# Patient Record
Sex: MALE | Race: WHITE | NOT HISPANIC OR LATINO | URBAN - METROPOLITAN AREA
[De-identification: names, ages, dates, MRNs, and addresses within clinical notes are randomized per-mention and may not be internally consistent; named-entity substitution may affect disease eponyms.]

---

## 2020-09-17 ENCOUNTER — INPATIENT (INPATIENT)
Facility: HOSPITAL | Age: 70
LOS: 3 days | Discharge: HOME | End: 2020-09-21
Attending: HOSPITALIST | Admitting: HOSPITALIST
Payer: MEDICARE

## 2020-09-17 VITALS
DIASTOLIC BLOOD PRESSURE: 76 MMHG | HEIGHT: 73 IN | TEMPERATURE: 103 F | SYSTOLIC BLOOD PRESSURE: 131 MMHG | HEART RATE: 125 BPM | RESPIRATION RATE: 25 BRPM | OXYGEN SATURATION: 87 %

## 2020-09-17 LAB
BASE EXCESS BLDV CALC-SCNC: -0.5 MMOL/L — SIGNIFICANT CHANGE UP
BLOOD GAS VENOUS - CREATININE: 1.74 MG/DL — HIGH (ref 0.5–1.3)
BLOOD GAS VENOUS - FIO2: 21 — SIGNIFICANT CHANGE UP
CHLORIDE BLDV-SCNC: 110 MMOL/L — HIGH (ref 96–108)
GAS PNL BLDV: 133 MMOL/L — LOW (ref 136–146)
GLUCOSE BLDV-MCNC: 136 MG/DL — HIGH (ref 70–99)
HCO3 BLDV-SCNC: 22 MMOL/L — SIGNIFICANT CHANGE UP (ref 20–27)
HCT VFR BLDV CALC: 43.1 % — SIGNIFICANT CHANGE UP (ref 39–51)
HGB BLDV-MCNC: 14 G/DL — SIGNIFICANT CHANGE UP (ref 13–17)
LACTATE BLDV-MCNC: 2.7 MMOL/L — HIGH (ref 0.5–2)
PCO2 BLDV: 46 MMHG — SIGNIFICANT CHANGE UP (ref 41–51)
PH BLDV: 7.34 PH — SIGNIFICANT CHANGE UP (ref 7.32–7.43)
PO2 BLDV: 30 MMHG — LOW (ref 35–40)
POTASSIUM BLDV-SCNC: 5.3 MMOL/L — HIGH (ref 3.4–4.5)
SAO2 % BLDV: 46.9 % — LOW (ref 60–85)

## 2020-09-17 PROCEDURE — 99284 EMERGENCY DEPT VISIT MOD MDM: CPT

## 2020-09-17 RX ORDER — ACETAMINOPHEN 500 MG
975 TABLET ORAL ONCE
Refills: 0 | Status: COMPLETED | OUTPATIENT
Start: 2020-09-17 | End: 2020-09-17

## 2020-09-17 RX ADMIN — Medication 975 MILLIGRAM(S): at 23:37

## 2020-09-17 NOTE — ED PROVIDER NOTE - PHYSICAL EXAMINATION
General: +labored breathing w/ retractions and accessory muscle use, coughing  HEENT: NCAT  Cardiac: Tachy but regular, no murmurs, 2+ peripheral pulses  Chest: +diffuse crackles  Abdomen: soft, non-distended, bowel sounds present, no ttp, no rebound or guarding  Extremities: -peripheral edema, -calf ttp  Neuro: AAOx4, motor and sensory grossly intact  Psych: mood and affect appropriate

## 2020-09-17 NOTE — ED PROVIDER NOTE - NS ED ROS FT
Constitutional: +fevers, chills  HEENT: +cough, -rhinorrhea  Cardiac: no chest pain, palpitations  Respiratory: +SOB  GI: no n/v, abd pain, +loose stools  : no dysuria  Neuro: no headache

## 2020-09-17 NOTE — ED ADULT NURSE NOTE - CHIEF COMPLAINT QUOTE
c/o SOB onset 1 weka go worse last two days, pt  reports hx of COPD, not on oxygen at home. [id number] in full and complete [id number] in triage, noted pt to have tachypnea,  pt endorses fever, chills, at home, audible  rales noted. pt reports Hx of tremors, unaware of specific diagnoses. Pt place don O2 non rebreather with improvement to 97% O 2sat.

## 2020-09-17 NOTE — ED ADULT NURSE NOTE - OBJECTIVE STATEMENT
Tereso RN: Received pt in room 6, pt A&Ox4, appears very tachypneic, o2sat 97% on NRB mask. Pt c/o worsening SOB, cough, fever for 1 week. Denies sick contacts, unknown covid exposure. Reports hx of COPD not on home o2. Also reports hx of b/l arm tremors. Abdomen soft, nondistended, nontender. Sinus tach on cardiac monitor. Reports everyday smoker. IVL 20g Angiocath placed on right AC. Labs drawn/sent. COVID-19 swab sent. MD Nevarez at bedside for eval. Report endorsed to primary RN Kiki.

## 2020-09-17 NOTE — ED PROVIDER NOTE - CARE PLAN
Principal Discharge DX:	Pneumonia  Secondary Diagnosis:	Respiratory distress  Secondary Diagnosis:	Hypoxia

## 2020-09-17 NOTE — ED ADULT TRIAGE NOTE - CHIEF COMPLAINT QUOTE
c/o SOB onset 1 weka go worse last two days, pt  reports hx of COPD, not on oxygen at home. [id number] in full and complete [id number] in triage, noted pt to have tachypnea,  pt endorses fever, chills, at home, audible  rales noted. pt reports Hx of tremors, unaware of specific diagnoses. Pt place don O2 non rebreather with improvement to 97% O 2sat.

## 2020-09-17 NOTE — ED PROVIDER NOTE - OBJECTIVE STATEMENT
70yo M hx of COPD not on home O2 comes to ED for 1 week of fevers and SOB. Associated w/ cough productive of clear sputum. Denies cp, leg swelling, calf tenderness. SOB not worse with laying down. No hx of CHF. Took Tylenol for fevers. No recent travel or sick contacts. Has had some loose stools over the past few days. Otherwise denies cp, n/v, abdominal, change in urine.

## 2020-09-17 NOTE — ED PROVIDER NOTE - CLINICAL SUMMARY MEDICAL DECISION MAKING FREE TEXT BOX
pt sob, hypoxic and febrile on presentation. Exam showing diffuse crackles bilaterally. Concern for COVID, pneumonia, CHF. Although patient meets sepsis criteria at this moment, will hold fluids until xray obtained. Pressure is holding steady w/ good mentation on exam. PE considered but alternative diagnosis more likely at this time. Labs, CXR, EKG, COVID, abx. Will closely reassess.

## 2020-09-17 NOTE — ED PROVIDER NOTE - ATTENDING CONTRIBUTION TO CARE
Attending note:   After face to face evaluation of this patient, I concur with above noted hx, pe, and care plan for this patient.  Nevarez: 69 yom smoker with COPD complaining of shortness of breath worsening for 8 days with 4 days of fever, cough with no sputum production, chest pain only with cough and worsening fatigue, can't sleep. No leg swelling, no travel, no covid contacts, only stays home or wears mask. On exam, pt is in mild resp distress, 91% RA, over 95% on 4L, diffuse crackles in all lung fields, tachy, febrile, RRR, abd soft and non tender, no edema, no calf tn. slightly pale. Pt is likely to have PNA bacterial vs covid  with hypoxia requiring new O2 use - labs, CXR, O2, antibiotics, admit.

## 2020-09-18 ENCOUNTER — TRANSCRIPTION ENCOUNTER (OUTPATIENT)
Age: 70
End: 2020-09-18

## 2020-09-18 DIAGNOSIS — J44.9 CHRONIC OBSTRUCTIVE PULMONARY DISEASE, UNSPECIFIED: ICD-10-CM

## 2020-09-18 DIAGNOSIS — N17.9 ACUTE KIDNEY FAILURE, UNSPECIFIED: ICD-10-CM

## 2020-09-18 DIAGNOSIS — J18.9 PNEUMONIA, UNSPECIFIED ORGANISM: ICD-10-CM

## 2020-09-18 DIAGNOSIS — I10 ESSENTIAL (PRIMARY) HYPERTENSION: ICD-10-CM

## 2020-09-18 DIAGNOSIS — R09.02 HYPOXEMIA: ICD-10-CM

## 2020-09-18 DIAGNOSIS — E78.5 HYPERLIPIDEMIA, UNSPECIFIED: ICD-10-CM

## 2020-09-18 DIAGNOSIS — Z00.00 ENCOUNTER FOR GENERAL ADULT MEDICAL EXAMINATION WITHOUT ABNORMAL FINDINGS: ICD-10-CM

## 2020-09-18 DIAGNOSIS — A41.9 SEPSIS, UNSPECIFIED ORGANISM: ICD-10-CM

## 2020-09-18 LAB
ALBUMIN SERPL ELPH-MCNC: 2.6 G/DL — LOW (ref 3.3–5)
ALBUMIN SERPL ELPH-MCNC: 3.1 G/DL — LOW (ref 3.3–5)
ALP SERPL-CCNC: 103 U/L — SIGNIFICANT CHANGE UP (ref 40–120)
ALP SERPL-CCNC: 86 U/L — SIGNIFICANT CHANGE UP (ref 40–120)
ALT FLD-CCNC: 38 U/L — SIGNIFICANT CHANGE UP (ref 4–41)
ALT FLD-CCNC: 49 U/L — HIGH (ref 4–41)
ANION GAP SERPL CALC-SCNC: 14 MMO/L — SIGNIFICANT CHANGE UP (ref 7–14)
ANION GAP SERPL CALC-SCNC: 21 MMO/L — HIGH (ref 7–14)
ANISOCYTOSIS BLD QL: SLIGHT — SIGNIFICANT CHANGE UP
APPEARANCE UR: SIGNIFICANT CHANGE UP
AST SERPL-CCNC: 26 U/L — SIGNIFICANT CHANGE UP (ref 4–40)
AST SERPL-CCNC: 36 U/L — SIGNIFICANT CHANGE UP (ref 4–40)
B PERT DNA SPEC QL NAA+PROBE: NOT DETECTED — SIGNIFICANT CHANGE UP
BACTERIA # UR AUTO: NEGATIVE — SIGNIFICANT CHANGE UP
BASE EXCESS BLDV CALC-SCNC: -4 MMOL/L — SIGNIFICANT CHANGE UP
BASOPHILS # BLD AUTO: 0.07 K/UL — SIGNIFICANT CHANGE UP (ref 0–0.2)
BASOPHILS # BLD AUTO: 0.07 K/UL — SIGNIFICANT CHANGE UP (ref 0–0.2)
BASOPHILS NFR BLD AUTO: 0.3 % — SIGNIFICANT CHANGE UP (ref 0–2)
BASOPHILS NFR BLD AUTO: 0.3 % — SIGNIFICANT CHANGE UP (ref 0–2)
BASOPHILS NFR SPEC: 0 % — SIGNIFICANT CHANGE UP (ref 0–2)
BILIRUB SERPL-MCNC: 0.3 MG/DL — SIGNIFICANT CHANGE UP (ref 0.2–1.2)
BILIRUB SERPL-MCNC: 0.5 MG/DL — SIGNIFICANT CHANGE UP (ref 0.2–1.2)
BILIRUB UR-MCNC: NEGATIVE — SIGNIFICANT CHANGE UP
BLOOD GAS VENOUS - CREATININE: 1.7 MG/DL — HIGH (ref 0.5–1.3)
BLOOD UR QL VISUAL: NEGATIVE — SIGNIFICANT CHANGE UP
BUN SERPL-MCNC: 43 MG/DL — HIGH (ref 7–23)
BUN SERPL-MCNC: 46 MG/DL — HIGH (ref 7–23)
C PNEUM DNA SPEC QL NAA+PROBE: NOT DETECTED — SIGNIFICANT CHANGE UP
CALCIUM SERPL-MCNC: 8.6 MG/DL — SIGNIFICANT CHANGE UP (ref 8.4–10.5)
CALCIUM SERPL-MCNC: 9.3 MG/DL — SIGNIFICANT CHANGE UP (ref 8.4–10.5)
CHLORIDE BLDV-SCNC: 115 MMOL/L — HIGH (ref 96–108)
CHLORIDE SERPL-SCNC: 103 MMOL/L — SIGNIFICANT CHANGE UP (ref 98–107)
CHLORIDE SERPL-SCNC: 97 MMOL/L — LOW (ref 98–107)
CO2 SERPL-SCNC: 19 MMOL/L — LOW (ref 22–31)
CO2 SERPL-SCNC: 21 MMOL/L — LOW (ref 22–31)
COLOR SPEC: YELLOW — SIGNIFICANT CHANGE UP
CREAT SERPL-MCNC: 1.67 MG/DL — HIGH (ref 0.5–1.3)
CREAT SERPL-MCNC: 1.76 MG/DL — HIGH (ref 0.5–1.3)
DACRYOCYTES BLD QL SMEAR: SLIGHT — SIGNIFICANT CHANGE UP
EOSINOPHIL # BLD AUTO: 0.03 K/UL — SIGNIFICANT CHANGE UP (ref 0–0.5)
EOSINOPHIL # BLD AUTO: 0.06 K/UL — SIGNIFICANT CHANGE UP (ref 0–0.5)
EOSINOPHIL NFR BLD AUTO: 0.1 % — SIGNIFICANT CHANGE UP (ref 0–6)
EOSINOPHIL NFR BLD AUTO: 0.3 % — SIGNIFICANT CHANGE UP (ref 0–6)
EOSINOPHIL NFR FLD: 0 % — SIGNIFICANT CHANGE UP (ref 0–6)
FERRITIN SERPL-MCNC: 2457 NG/ML — HIGH (ref 30–400)
FLUAV H1 2009 PAND RNA SPEC QL NAA+PROBE: NOT DETECTED — SIGNIFICANT CHANGE UP
FLUAV H1 RNA SPEC QL NAA+PROBE: NOT DETECTED — SIGNIFICANT CHANGE UP
FLUAV H3 RNA SPEC QL NAA+PROBE: NOT DETECTED — SIGNIFICANT CHANGE UP
FLUAV SUBTYP SPEC NAA+PROBE: NOT DETECTED — SIGNIFICANT CHANGE UP
FLUBV RNA SPEC QL NAA+PROBE: NOT DETECTED — SIGNIFICANT CHANGE UP
GAS PNL BLDV: 135 MMOL/L — LOW (ref 136–146)
GIANT PLATELETS BLD QL SMEAR: PRESENT — SIGNIFICANT CHANGE UP
GLUCOSE BLDV-MCNC: 147 MG/DL — HIGH (ref 70–99)
GLUCOSE SERPL-MCNC: 142 MG/DL — HIGH (ref 70–99)
GLUCOSE SERPL-MCNC: 158 MG/DL — HIGH (ref 70–99)
GLUCOSE UR-MCNC: NEGATIVE — SIGNIFICANT CHANGE UP
HADV DNA SPEC QL NAA+PROBE: NOT DETECTED — SIGNIFICANT CHANGE UP
HCO3 BLDV-SCNC: 21 MMOL/L — SIGNIFICANT CHANGE UP (ref 20–27)
HCOV PNL SPEC NAA+PROBE: SIGNIFICANT CHANGE UP
HCT VFR BLD CALC: 35.4 % — LOW (ref 39–50)
HCT VFR BLD CALC: 42 % — SIGNIFICANT CHANGE UP (ref 39–50)
HCT VFR BLDV CALC: 34.8 % — LOW (ref 39–51)
HGB BLD-MCNC: 11.4 G/DL — LOW (ref 13–17)
HGB BLD-MCNC: 13.4 G/DL — SIGNIFICANT CHANGE UP (ref 13–17)
HGB BLDV-MCNC: 11.3 G/DL — LOW (ref 13–17)
HMPV RNA SPEC QL NAA+PROBE: NOT DETECTED — SIGNIFICANT CHANGE UP
HPIV1 RNA SPEC QL NAA+PROBE: NOT DETECTED — SIGNIFICANT CHANGE UP
HPIV2 RNA SPEC QL NAA+PROBE: NOT DETECTED — SIGNIFICANT CHANGE UP
HPIV3 RNA SPEC QL NAA+PROBE: NOT DETECTED — SIGNIFICANT CHANGE UP
HPIV4 RNA SPEC QL NAA+PROBE: NOT DETECTED — SIGNIFICANT CHANGE UP
HYALINE CASTS # UR AUTO: SIGNIFICANT CHANGE UP
IMM GRANULOCYTES NFR BLD AUTO: 1.6 % — HIGH (ref 0–1.5)
IMM GRANULOCYTES NFR BLD AUTO: 1.9 % — HIGH (ref 0–1.5)
KETONES UR-MCNC: NEGATIVE — SIGNIFICANT CHANGE UP
L PNEUMO AG UR QL: NEGATIVE — SIGNIFICANT CHANGE UP
LACTATE BLDV-MCNC: 1 MMOL/L — SIGNIFICANT CHANGE UP (ref 0.5–2)
LDH SERPL L TO P-CCNC: 209 U/L — SIGNIFICANT CHANGE UP (ref 135–225)
LEUKOCYTE ESTERASE UR-ACNC: NEGATIVE — SIGNIFICANT CHANGE UP
LYMPHOCYTES # BLD AUTO: 1.05 K/UL — SIGNIFICANT CHANGE UP (ref 1–3.3)
LYMPHOCYTES # BLD AUTO: 1.35 K/UL — SIGNIFICANT CHANGE UP (ref 1–3.3)
LYMPHOCYTES # BLD AUTO: 4.6 % — LOW (ref 13–44)
LYMPHOCYTES # BLD AUTO: 5.2 % — LOW (ref 13–44)
LYMPHOCYTES NFR SPEC AUTO: 4 % — LOW (ref 13–44)
MACROCYTES BLD QL: SIGNIFICANT CHANGE UP
MAGNESIUM SERPL-MCNC: 2.4 MG/DL — SIGNIFICANT CHANGE UP (ref 1.6–2.6)
MANUAL SMEAR VERIFICATION: SIGNIFICANT CHANGE UP
MCHC RBC-ENTMCNC: 28.8 PG — SIGNIFICANT CHANGE UP (ref 27–34)
MCHC RBC-ENTMCNC: 29.1 PG — SIGNIFICANT CHANGE UP (ref 27–34)
MCHC RBC-ENTMCNC: 31.9 % — LOW (ref 32–36)
MCHC RBC-ENTMCNC: 32.2 % — SIGNIFICANT CHANGE UP (ref 32–36)
MCV RBC AUTO: 90.3 FL — SIGNIFICANT CHANGE UP (ref 80–100)
MCV RBC AUTO: 90.3 FL — SIGNIFICANT CHANGE UP (ref 80–100)
METAMYELOCYTES # FLD: 1 % — SIGNIFICANT CHANGE UP (ref 0–1)
MONOCYTES # BLD AUTO: 1.83 K/UL — HIGH (ref 0–0.9)
MONOCYTES # BLD AUTO: 2.3 K/UL — HIGH (ref 0–0.9)
MONOCYTES NFR BLD AUTO: 10 % — SIGNIFICANT CHANGE UP (ref 2–14)
MONOCYTES NFR BLD AUTO: 7 % — SIGNIFICANT CHANGE UP (ref 2–14)
MONOCYTES NFR BLD: 4 % — SIGNIFICANT CHANGE UP (ref 2–9)
NEUTROPHIL AB SER-ACNC: 86 % — HIGH (ref 43–77)
NEUTROPHILS # BLD AUTO: 19.12 K/UL — HIGH (ref 1.8–7.4)
NEUTROPHILS # BLD AUTO: 22.5 K/UL — HIGH (ref 1.8–7.4)
NEUTROPHILS NFR BLD AUTO: 82.9 % — HIGH (ref 43–77)
NEUTROPHILS NFR BLD AUTO: 85.8 % — HIGH (ref 43–77)
NEUTS BAND # BLD: 4 % — SIGNIFICANT CHANGE UP (ref 0–6)
NITRITE UR-MCNC: NEGATIVE — SIGNIFICANT CHANGE UP
NRBC # BLD: 0 /100WBC — SIGNIFICANT CHANGE UP
NRBC # FLD: 0 K/UL — SIGNIFICANT CHANGE UP (ref 0–0)
NRBC # FLD: 0 K/UL — SIGNIFICANT CHANGE UP (ref 0–0)
OVALOCYTES BLD QL SMEAR: SLIGHT — SIGNIFICANT CHANGE UP
PCO2 BLDV: 36 MMHG — LOW (ref 41–51)
PH BLDV: 7.37 PH — SIGNIFICANT CHANGE UP (ref 7.32–7.43)
PH UR: 5.5 — SIGNIFICANT CHANGE UP (ref 5–8)
PHOSPHATE SERPL-MCNC: 4.2 MG/DL — SIGNIFICANT CHANGE UP (ref 2.5–4.5)
PLATELET # BLD AUTO: 354 K/UL — SIGNIFICANT CHANGE UP (ref 150–400)
PLATELET # BLD AUTO: 389 K/UL — SIGNIFICANT CHANGE UP (ref 150–400)
PLATELET CLUMP BLD QL SMEAR: SLIGHT — SIGNIFICANT CHANGE UP
PLATELET COUNT - ESTIMATE: NORMAL — SIGNIFICANT CHANGE UP
PMV BLD: 10.5 FL — SIGNIFICANT CHANGE UP (ref 7–13)
PMV BLD: 10.6 FL — SIGNIFICANT CHANGE UP (ref 7–13)
PO2 BLDV: 119 MMHG — HIGH (ref 35–40)
POIKILOCYTOSIS BLD QL AUTO: SIGNIFICANT CHANGE UP
POLYCHROMASIA BLD QL SMEAR: SLIGHT — SIGNIFICANT CHANGE UP
POTASSIUM BLDV-SCNC: 4.1 MMOL/L — SIGNIFICANT CHANGE UP (ref 3.4–4.5)
POTASSIUM SERPL-MCNC: 4.4 MMOL/L — SIGNIFICANT CHANGE UP (ref 3.5–5.3)
POTASSIUM SERPL-MCNC: 4.8 MMOL/L — SIGNIFICANT CHANGE UP (ref 3.5–5.3)
POTASSIUM SERPL-SCNC: 4.4 MMOL/L — SIGNIFICANT CHANGE UP (ref 3.5–5.3)
POTASSIUM SERPL-SCNC: 4.8 MMOL/L — SIGNIFICANT CHANGE UP (ref 3.5–5.3)
POTASSIUM UR-SCNC: 60.8 MMOL/L — SIGNIFICANT CHANGE UP
PROCALCITONIN SERPL-MCNC: 0.35 NG/ML — HIGH (ref 0.02–0.1)
PROT SERPL-MCNC: 6.9 G/DL — SIGNIFICANT CHANGE UP (ref 6–8.3)
PROT SERPL-MCNC: 7.9 G/DL — SIGNIFICANT CHANGE UP (ref 6–8.3)
PROT UR-MCNC: 30 — SIGNIFICANT CHANGE UP
RBC # BLD: 3.92 M/UL — LOW (ref 4.2–5.8)
RBC # BLD: 4.65 M/UL — SIGNIFICANT CHANGE UP (ref 4.2–5.8)
RBC # FLD: 15.1 % — HIGH (ref 10.3–14.5)
RBC # FLD: 15.2 % — HIGH (ref 10.3–14.5)
RBC CASTS # UR COMP ASSIST: SIGNIFICANT CHANGE UP (ref 0–?)
REVIEW TO FOLLOW: YES — SIGNIFICANT CHANGE UP
RSV RNA SPEC QL NAA+PROBE: NOT DETECTED — SIGNIFICANT CHANGE UP
RV+EV RNA SPEC QL NAA+PROBE: NOT DETECTED — SIGNIFICANT CHANGE UP
SAO2 % BLDV: 98.4 % — HIGH (ref 60–85)
SARS-COV-2 RNA SPEC QL NAA+PROBE: SIGNIFICANT CHANGE UP
SARS-COV-2 RNA SPEC QL NAA+PROBE: SIGNIFICANT CHANGE UP
SODIUM SERPL-SCNC: 137 MMOL/L — SIGNIFICANT CHANGE UP (ref 135–145)
SODIUM SERPL-SCNC: 138 MMOL/L — SIGNIFICANT CHANGE UP (ref 135–145)
SP GR SPEC: 1.02 — SIGNIFICANT CHANGE UP (ref 1–1.04)
SQUAMOUS # UR AUTO: SIGNIFICANT CHANGE UP
UROBILINOGEN FLD QL: NORMAL — SIGNIFICANT CHANGE UP
VARIANT LYMPHS # BLD: 1 % — SIGNIFICANT CHANGE UP
WBC # BLD: 23.04 K/UL — HIGH (ref 3.8–10.5)
WBC # BLD: 26.21 K/UL — HIGH (ref 3.8–10.5)
WBC # FLD AUTO: 23.04 K/UL — HIGH (ref 3.8–10.5)
WBC # FLD AUTO: 26.21 K/UL — HIGH (ref 3.8–10.5)
WBC UR QL: SIGNIFICANT CHANGE UP (ref 0–?)

## 2020-09-18 PROCEDURE — 71045 X-RAY EXAM CHEST 1 VIEW: CPT | Mod: 26

## 2020-09-18 PROCEDURE — 99223 1ST HOSP IP/OBS HIGH 75: CPT | Mod: GC,AI

## 2020-09-18 PROCEDURE — 99291 CRITICAL CARE FIRST HOUR: CPT

## 2020-09-18 RX ORDER — SODIUM CHLORIDE 9 MG/ML
1000 INJECTION INTRAMUSCULAR; INTRAVENOUS; SUBCUTANEOUS
Refills: 0 | Status: DISCONTINUED | OUTPATIENT
Start: 2020-09-18 | End: 2020-09-21

## 2020-09-18 RX ORDER — CEFTRIAXONE 500 MG/1
1000 INJECTION, POWDER, FOR SOLUTION INTRAMUSCULAR; INTRAVENOUS ONCE
Refills: 0 | Status: COMPLETED | OUTPATIENT
Start: 2020-09-18 | End: 2020-09-18

## 2020-09-18 RX ORDER — CHOLECALCIFEROL (VITAMIN D3) 125 MCG
0 CAPSULE ORAL
Qty: 0 | Refills: 0 | DISCHARGE

## 2020-09-18 RX ORDER — PROPRANOLOL HCL 160 MG
1 CAPSULE, EXTENDED RELEASE 24HR ORAL
Qty: 0 | Refills: 0 | DISCHARGE

## 2020-09-18 RX ORDER — PREGABALIN 225 MG/1
0 CAPSULE ORAL
Qty: 0 | Refills: 0 | DISCHARGE

## 2020-09-18 RX ORDER — FLUTICASONE FUROATE, UMECLIDINIUM BROMIDE AND VILANTEROL TRIFENATATE 200; 62.5; 25 UG/1; UG/1; UG/1
1 POWDER RESPIRATORY (INHALATION)
Qty: 0 | Refills: 0 | DISCHARGE

## 2020-09-18 RX ORDER — IPRATROPIUM/ALBUTEROL SULFATE 18-103MCG
3 AEROSOL WITH ADAPTER (GRAM) INHALATION EVERY 6 HOURS
Refills: 0 | Status: DISCONTINUED | OUTPATIENT
Start: 2020-09-18 | End: 2020-09-18

## 2020-09-18 RX ORDER — HEPARIN SODIUM 5000 [USP'U]/ML
5000 INJECTION INTRAVENOUS; SUBCUTANEOUS EVERY 12 HOURS
Refills: 0 | Status: DISCONTINUED | OUTPATIENT
Start: 2020-09-18 | End: 2020-09-21

## 2020-09-18 RX ORDER — AZITHROMYCIN 500 MG/1
500 TABLET, FILM COATED ORAL EVERY 24 HOURS
Refills: 0 | Status: DISCONTINUED | OUTPATIENT
Start: 2020-09-19 | End: 2020-09-21

## 2020-09-18 RX ORDER — ALBUTEROL 90 UG/1
2 AEROSOL, METERED ORAL ONCE
Refills: 0 | Status: COMPLETED | OUTPATIENT
Start: 2020-09-18 | End: 2020-09-18

## 2020-09-18 RX ORDER — ALBUTEROL 90 UG/1
2 AEROSOL, METERED ORAL EVERY 6 HOURS
Refills: 0 | Status: DISCONTINUED | OUTPATIENT
Start: 2020-09-18 | End: 2020-09-21

## 2020-09-18 RX ORDER — OLMESARTAN MEDOXOMIL 5 MG/1
1 TABLET, FILM COATED ORAL
Qty: 0 | Refills: 0 | DISCHARGE

## 2020-09-18 RX ORDER — ACETAMINOPHEN 500 MG
650 TABLET ORAL EVERY 6 HOURS
Refills: 0 | Status: DISCONTINUED | OUTPATIENT
Start: 2020-09-18 | End: 2020-09-21

## 2020-09-18 RX ORDER — ASPIRIN/CALCIUM CARB/MAGNESIUM 324 MG
0 TABLET ORAL
Qty: 0 | Refills: 0 | DISCHARGE

## 2020-09-18 RX ORDER — ASPIRIN/CALCIUM CARB/MAGNESIUM 324 MG
81 TABLET ORAL DAILY
Refills: 0 | Status: DISCONTINUED | OUTPATIENT
Start: 2020-09-18 | End: 2020-09-21

## 2020-09-18 RX ORDER — CEFTRIAXONE 500 MG/1
1000 INJECTION, POWDER, FOR SOLUTION INTRAMUSCULAR; INTRAVENOUS EVERY 24 HOURS
Refills: 0 | Status: DISCONTINUED | OUTPATIENT
Start: 2020-09-18 | End: 2020-09-21

## 2020-09-18 RX ORDER — ALBUTEROL 90 UG/1
2 AEROSOL, METERED ORAL ONCE
Refills: 0 | Status: COMPLETED | OUTPATIENT
Start: 2020-09-18 | End: 2021-08-17

## 2020-09-18 RX ORDER — AZITHROMYCIN 500 MG/1
500 TABLET, FILM COATED ORAL ONCE
Refills: 0 | Status: COMPLETED | OUTPATIENT
Start: 2020-09-18 | End: 2020-09-18

## 2020-09-18 RX ORDER — PROPRANOLOL HCL 160 MG
120 CAPSULE, EXTENDED RELEASE 24HR ORAL DAILY
Refills: 0 | Status: DISCONTINUED | OUTPATIENT
Start: 2020-09-18 | End: 2020-09-18

## 2020-09-18 RX ORDER — ATORVASTATIN CALCIUM 80 MG/1
10 TABLET, FILM COATED ORAL AT BEDTIME
Refills: 0 | Status: DISCONTINUED | OUTPATIENT
Start: 2020-09-18 | End: 2020-09-21

## 2020-09-18 RX ORDER — SODIUM CHLORIDE 9 MG/ML
1000 INJECTION INTRAMUSCULAR; INTRAVENOUS; SUBCUTANEOUS ONCE
Refills: 0 | Status: COMPLETED | OUTPATIENT
Start: 2020-09-18 | End: 2020-09-18

## 2020-09-18 RX ORDER — CEFTRIAXONE 500 MG/1
1000 INJECTION, POWDER, FOR SOLUTION INTRAMUSCULAR; INTRAVENOUS EVERY 24 HOURS
Refills: 0 | Status: DISCONTINUED | OUTPATIENT
Start: 2020-09-19 | End: 2020-09-18

## 2020-09-18 RX ADMIN — Medication 81 MILLIGRAM(S): at 14:31

## 2020-09-18 RX ADMIN — ALBUTEROL 2 PUFF(S): 90 AEROSOL, METERED ORAL at 10:42

## 2020-09-18 RX ADMIN — ALBUTEROL 2 PUFF(S): 90 AEROSOL, METERED ORAL at 22:53

## 2020-09-18 RX ADMIN — CEFTRIAXONE 100 MILLIGRAM(S): 500 INJECTION, POWDER, FOR SOLUTION INTRAMUSCULAR; INTRAVENOUS at 22:54

## 2020-09-18 RX ADMIN — Medication 125 MILLIGRAM(S): at 05:44

## 2020-09-18 RX ADMIN — ATORVASTATIN CALCIUM 10 MILLIGRAM(S): 80 TABLET, FILM COATED ORAL at 22:53

## 2020-09-18 RX ADMIN — SODIUM CHLORIDE 500 MILLILITER(S): 9 INJECTION INTRAMUSCULAR; INTRAVENOUS; SUBCUTANEOUS at 03:12

## 2020-09-18 RX ADMIN — AZITHROMYCIN 255 MILLIGRAM(S): 500 TABLET, FILM COATED ORAL at 01:01

## 2020-09-18 RX ADMIN — AZITHROMYCIN 500 MILLIGRAM(S): 500 TABLET, FILM COATED ORAL at 02:00

## 2020-09-18 RX ADMIN — Medication 975 MILLIGRAM(S): at 00:46

## 2020-09-18 RX ADMIN — HEPARIN SODIUM 5000 UNIT(S): 5000 INJECTION INTRAVENOUS; SUBCUTANEOUS at 17:40

## 2020-09-18 RX ADMIN — SODIUM CHLORIDE 75 MILLILITER(S): 9 INJECTION INTRAMUSCULAR; INTRAVENOUS; SUBCUTANEOUS at 10:42

## 2020-09-18 RX ADMIN — CEFTRIAXONE 100 MILLIGRAM(S): 500 INJECTION, POWDER, FOR SOLUTION INTRAMUSCULAR; INTRAVENOUS at 00:36

## 2020-09-18 RX ADMIN — SODIUM CHLORIDE 1000 MILLILITER(S): 9 INJECTION INTRAMUSCULAR; INTRAVENOUS; SUBCUTANEOUS at 04:00

## 2020-09-18 NOTE — H&P ADULT - NSHPPHYSICALEXAM_GEN_ALL_CORE
Appearance: Well appearing, alert, interactive, on BiPAP  HEENT: Extra ocular movements intact; oral mucosa moist  Neck: Supple, normal thyroid. No JVD.   Respiratory: +Left sided coarse breath sounds. No wheezing.   Cardiovascular: Regular rate and variability; Normal S1, S2; No murmurs, rubs, or gallops  Abdomen: Abdomen soft; no distension; no tenderness; no masses or organomegaly. Bowel sounds present  Extremities: Full range of motion, no erythema; no edema  Neurology: Affect appropriate; interactive; verbalization clear and understandable; sensation grossly intact to touch  Skin: No rashes, no cyanosis Appearance: Well appearing, alert, interactive, on BiPAP  HEENT: Extra ocular movements intact; oral mucosa moist  Neck: Supple, normal thyroid. No JVD.   Respiratory: +Left sided coarse breath sounds. Minimal wheezing.   Cardiovascular: Regular rate and variability; Normal S1, S2; No murmurs, rubs, or gallops  Abdomen: Abdomen soft; no distension; no tenderness; no masses or organomegaly. Bowel sounds present  Extremities: Full range of motion, no erythema; no edema  Neurology: Affect appropriate; interactive; verbalization clear and understandable; sensation grossly intact to touch, +bilateral hand tremor with arms outstretched   Skin: No rashes, no cyanosis Vital Signs Last 24 Hrs  T(C): 36.7 (18 Sep 2020 05:44), Max: 39.5 (17 Sep 2020 22:52)  T(F): 98 (18 Sep 2020 05:44), Max: 103.1 (17 Sep 2020 22:52)  HR: 60 (18 Sep 2020 11:52) (60 - 125)  BP: 114/70 (18 Sep 2020 11:52) (103/54 - 143/68)  BP(mean): --  RR: 18 (18 Sep 2020 11:52) (18 - 32)  SpO2: 97% (18 Sep 2020 11:52) (87% - 99%)    Appearance: Well appearing, alert, interactive, on BiPAP  HEENT: Extra ocular movements intact; oral mucosa moist  Neck: Supple, normal thyroid. No JVD.   Respiratory: +Left sided coarse breath sounds. Minimal wheezing.   Cardiovascular: Regular rate and variability; Normal S1, S2; No murmurs, rubs, or gallops  Abdomen: Abdomen soft; no distension; no tenderness; no masses or organomegaly. Bowel sounds present  Extremities: Full range of motion, no erythema; no edema  Neurology: Affect appropriate; interactive; verbalization clear and understandable; sensation grossly intact to touch, +bilateral hand tremor with arms outstretched   Skin: No rashes, no cyanosis Vital Signs Last 24 Hrs  T(C): 36.7 (18 Sep 2020 05:44), Max: 39.5 (17 Sep 2020 22:52)  T(F): 98 (18 Sep 2020 05:44), Max: 103.1 (17 Sep 2020 22:52)  HR: 60 (18 Sep 2020 11:52) (60 - 125)  BP: 114/70 (18 Sep 2020 11:52) (103/54 - 143/68)  BP(mean): --  RR: 18 (18 Sep 2020 11:52) (18 - 32)  SpO2: 97% (18 Sep 2020 11:52) (87% - 99%)    Appearance: Well appearing, alert, interactive, on BiPAP  HEENT: Extra ocular movements intact; oral mucosa moist  Neck: Supple, normal thyroid. No JVD.   Respiratory: +Left sided crackles. Minimal wheezing.   Cardiovascular: Regular rate and variability; Normal S1, S2; No murmurs, rubs, or gallops  Abdomen: Abdomen soft; no distension; no tenderness; no masses or organomegaly. Bowel sounds present  Extremities: Full range of motion, no erythema; no edema  Neurology: Affect appropriate; interactive; verbalization clear and understandable; sensation grossly intact to touch, +bilateral hand tremor with arms outstretched   Skin: No rashes, no cyanosis Vital Signs Last 24 Hrs  T(C): 36.7 (18 Sep 2020 05:44), Max: 39.5 (17 Sep 2020 22:52)  T(F): 98 (18 Sep 2020 05:44), Max: 103.1 (17 Sep 2020 22:52)  HR: 60 (18 Sep 2020 11:52) (60 - 125)  BP: 114/70 (18 Sep 2020 11:52) (103/54 - 143/68)  RR: 18 (18 Sep 2020 11:52) (18 - 32)  SpO2: 97% (18 Sep 2020 11:52) (87% - 99%)    Appearance: Well appearing, alert, interactive, on BiPAP  HEENT: Extra ocular movements intact; oral mucosa moist  Neck: Supple, normal thyroid. No JVD.   Respiratory: +Left sided crackles. Minimal wheezing.   Cardiovascular: Regular rate and variability; Normal S1, S2; No murmurs, rubs, or gallops  Abdomen: Abdomen soft; no distension; no tenderness; no masses or organomegaly. Bowel sounds present  Extremities: Full range of motion, no erythema; no edema  Neurology: Affect appropriate; interactive; verbalization clear and understandable; sensation grossly intact to touch, +bilateral hand tremor with arms outstretched   Skin: No rashes, no cyanosis

## 2020-09-18 NOTE — H&P ADULT - PROBLEM SELECTOR PLAN 2
Chest xray showing left lower lobe opacity. Patient presenting with fevers and shortness of breath  -Continue antibiotics: azithromycin and ceftriaxone for treatment of CAP  -Will switch BiPAP to nasal cannula  -F/u blood cultures, urine legionella

## 2020-09-18 NOTE — ED ADULT NURSE REASSESSMENT NOTE - NS ED NURSE REASSESS COMMENT FT1
Pt awake, alert and oriented x 4 denies chest pain, denies shortness of breath.   Respirations even and unlabored.   Pt remains off Bipap, on 2L nasal cannula maintaining spo2 95% and greater with no respiratory distress.

## 2020-09-18 NOTE — H&P ADULT - NSHPREVIEWOFSYSTEMS_GEN_ALL_CORE
No Gen: +fever, +decreased appetite, no weight loss  ENT: No ear pain, congestion, sore throat  Resp: +cough +shortness of breath  Cardiovascular: No chest pain or palpitations  Gastroenteric: No nausea/vomiting, diarrhea, constipation  :  +urinary frequency, +urinary urgency  MS: No joint or muscle pain  Skin: No rashes  Neuro: No headache no dizziness, no seizures  Remainder negative, except as per the HPI

## 2020-09-18 NOTE — H&P ADULT - PROBLEM SELECTOR PLAN 4
Elevated BUN and Cr with BUN/Cr ratio 25.7. Likely prerenal in etiology. Patient reporting low PO intake  -IV normal saline  -Trend Cr  -Will contact PCP regarding history of renal disease   -Bladder scan to assess for retention Elevated BUN and Cr with BUN/Cr ratio 25.7. Likely prerenal in etiology. Patient reporting low PO intake  -IV normal saline  -Trend Cr  -PCP reports baseline creatinine of 1.4  -Bladder scan to assess for retention

## 2020-09-18 NOTE — H&P ADULT - ATTENDING COMMENTS
Sepsis (leukocytosis, fever) on admission d/t L sided pneumonia, c/w Ceftriaxone/Zithromax, f/up blood cultures and urine legionella  Acute hypoxic respiratory distress d/t pneumonia and COPD exacerbation, s/p BIPAP, will de-escalate to nasal canula, monitor O2 sats   COPD exacerbation, antibiotics as above, duonebs ATC, start Prednisone 20mg BID  MAX possibly pre-renal vs ATN in setting of sepsis, c/w IVF, check UA and urine lytes, check bladder scan, hold ARB, monitor Cr   HTN, hold ARB d/t MAX, monitor BP

## 2020-09-18 NOTE — DISCHARGE NOTE PROVIDER - NSDCCPCAREPLAN_GEN_ALL_CORE_FT
PRINCIPAL DISCHARGE DIAGNOSIS  Diagnosis: Pneumonia  Assessment and Plan of Treatment:       SECONDARY DISCHARGE DIAGNOSES  Diagnosis: COPD (chronic obstructive pulmonary disease)  Assessment and Plan of Treatment: COPD (chronic obstructive pulmonary disease)    Diagnosis: Hyperlipidemia  Assessment and Plan of Treatment: Hyperlipidemia    Diagnosis: Hypertension  Assessment and Plan of Treatment: Hypertension     PRINCIPAL DISCHARGE DIAGNOSIS  Diagnosis: Pneumonia  Assessment and Plan of Treatment: You came in with shortness of breath and cough. Your chest xray showed a pneumonia and you were given IV antibiotics to treat the infeciton. You briefly required a BIPAP mask to help you breathe, and were transitioned to nasal cannula, and subsequently weaned to room air. You will need to continue taking your antibioitics as prescribed and follow up with your PCP/pulmonologist on discharge. Continue taking your prednisone as prescribed as well.      SECONDARY DISCHARGE DIAGNOSES  Diagnosis: COPD (chronic obstructive pulmonary disease)  Assessment and Plan of Treatment: You should follow up with your PCP/pulmonologist for further management of your COPD. Please consider limiting your tobacco use as this will worsen your disease progression.     PRINCIPAL DISCHARGE DIAGNOSIS  Diagnosis: Pneumonia  Assessment and Plan of Treatment: You came in with shortness of breath and cough. Your chest xray showed a pneumonia and you were given IV antibiotics to treat the infeciton. You briefly required a BIPAP mask to help you breathe, and were transitioned to nasal cannula, and subsequently weaned to room air.  You will need to continue taking your antibioitics as prescribed (Levaquin 750mg through Monday 9/28) and follow up with your PCP/pulmonologist on discharge.  Continue taking your prednisone as prescribed as well - 20mg daily, for 5 days, through Saturday 9/26.      SECONDARY DISCHARGE DIAGNOSES  Diagnosis: COPD (chronic obstructive pulmonary disease)  Assessment and Plan of Treatment: You should follow up with your PCP/pulmonologist for further management of your COPD. Please consider limiting your tobacco use as this will worsen your disease progression.     PRINCIPAL DISCHARGE DIAGNOSIS  Diagnosis: Pneumonia  Assessment and Plan of Treatment: You came in with shortness of breath and cough. Your chest xray showed a pneumonia and you were given IV antibiotics to treat the infeciton. You briefly required a BIPAP mask to help you breathe, and were transitioned to nasal cannula, and subsequently weaned to room air.  You will need to continue taking your antibioitics as prescribed (Levaquin 750mg daily through Monday 9/28) and follow up with your PCP/pulmonologist on discharge.  Continue taking your prednisone as prescribed as well - 20mg daily, for 5 days, through Saturday 9/26.      SECONDARY DISCHARGE DIAGNOSES  Diagnosis: COPD (chronic obstructive pulmonary disease)  Assessment and Plan of Treatment: You should follow up with your PCP/pulmonologist for further management of your COPD. Please consider limiting your tobacco use as this will worsen your disease progression.     PRINCIPAL DISCHARGE DIAGNOSIS  Diagnosis: Pneumonia  Assessment and Plan of Treatment: You came in with shortness of breath and cough. Your chest xray showed a pneumonia and you were given IV antibiotics to treat the infeciton. You briefly required a BIPAP mask to help you breathe, and were transitioned to nasal cannula, and subsequently weaned to room air.  You will need to continue taking your antibioitics as prescribed (Levaquin 750mg daily through Monday 9/28). Please follow up with your pulmonologist on discharge, an appt has been made for Friday 9/25 at 11:15 AM.   Continue taking your prednisone as prescribed as well - 20mg daily, for 5 days, through Saturday 9/26.      SECONDARY DISCHARGE DIAGNOSES  Diagnosis: COPD (chronic obstructive pulmonary disease)  Assessment and Plan of Treatment: You should follow up with your PCP/pulmonologist for further management of your COPD. Please consider limiting your tobacco use as this will worsen your disease progression.  Please follow up with your pulmonologist on discharge, an appt has been made for Friday 9/25 at 11:15 AM.

## 2020-09-18 NOTE — CONSULT NOTE ADULT - SUBJECTIVE AND OBJECTIVE BOX
CHIEF COMPLAINT:    HPI:    69 year old male with COPD (not on home O2) and current smoker presents to the ED for SOB x 1 week.  Patient also had associated fevers and MANUEL.  Patient states that his cough is not worse than his baseline.  Denies any orthopnea or leg swelling.  Denies any lightheadedness, dizziness, chest pain, palpitations.  Patient is not on any inhalers for his COPD.  Denies any abdominal pain, nausea/vomiting, diarrhea, dysuria, or increased frequency of urination.      MICU consulted for new BIPAP and per ED placed on BIPAP for hypoxia and tachypnea.  Patient states he feels much better since arriving.      PAST MEDICAL & SURGICAL HISTORY:  COPD (chronic obstructive pulmonary disease)      SOCIAL HISTORY:  Smokin PPD for 35 years and for the past 6 months 4 cigarettes   EtOH Use: None  Drug use: None     Allergies    No Known Allergies    Intolerances        HOME MEDICATIONS:    REVIEW OF SYSTEMS:    CONSTITUTIONAL: No weakness,+fevers + chills   EYES/ENT: No visual changes;  No vertigo or throat pain   NECK: No pain or stiffness  RESPIRATORY: No cough, wheezing, hemoptysis; +SOB  CARDIOVASCULAR: No chest pain or palpitations  GASTROINTESTINAL: No abdominal or epigastric pain. No nausea, vomiting, or hematemesis; No diarrhea or constipation. No melena or hematochezia.  GENITOURINARY: No dysuria, frequency or hematuria  NEUROLOGICAL: No numbness or weakness  SKIN: No itching, rashes      OBJECTIVE:  ICU Vital Signs Last 24 Hrs  T(C): 36.1 (18 Sep 2020 04:05), Max: 39.5 (17 Sep 2020 22:52)  T(F): 97 (18 Sep 2020 04:05), Max: 103.1 (17 Sep 2020 22:52)  HR: 78 (18 Sep 2020 04:05) (78 - 125)  BP: 103/54 (18 Sep 2020 04:05) (103/54 - 143/68)  BP(mean): --  ABP: --  ABP(mean): --  RR: 22 (18 Sep 2020 04:05) (22 - 32)  SpO2: 99% (18 Sep 2020 04:05) (87% - 99%)        CAPILLARY BLOOD GLUCOSE    PHYSICAL EXAM:  GENERAL: NAD, well-developed on BIPAP   HEAD:  Atraumatic, Normocephalic  EYES: EOMI, , conjunctiva and sclera clear  NECK: Supple,  CHEST/LUNG: ronchorus lung sounds L > R   HEART: Regular rate and rhythm; No murmurs, rubs, or gallops  ABDOMEN: Soft, Nontender, Nondistended; Bowel sounds present  EXTREMITIES:  2+ Peripheral Pulses, No clubbing, cyanosis, or edema  PSYCH: AAOx3  NEUROLOGY: non-focal  SKIN: No rashes or lesions    HOSPITAL MEDICATIONS:  MEDICATIONS  (STANDING):    MEDICATIONS  (PRN):      LABS:                        13.4   23.04 )-----------( 389      ( 17 Sep 2020 23:15 )             42.0         137  |  97<L>  |  43<H>  ----------------------------<  142<H>  4.4   |  19<L>  |  1.67<H>    Ca    9.3      17 Sep 2020 23:15    TPro  7.9  /  Alb  3.1<L>  /  TBili  0.5  /  DBili  x   /  AST  36  /  ALT  49<H>  /  AlkPhos  103            Venous Blood Gas:   @ 23:15  7.34/46/30/22/46.9  VBG Lactate: 2.7      MICROBIOLOGY:     RADIOLOGY:  [x ] Reviewed and interpreted by me

## 2020-09-18 NOTE — CONSULT NOTE ADULT - ATTENDING COMMENTS
69 with copd and LLL PNA requiring bipap for acute hypoxic respiratory distress   pt improved on bipap, consider HFNC   pancx, ABx  steroids, nebs  r/o COVID, RVP

## 2020-09-18 NOTE — DISCHARGE NOTE PROVIDER - PROVIDER TOKENS
PROVIDER:[TOKEN:[[id number]],FOLLOWUP:[2 weeks],ESTABLISHEDPATIENT:[T]],FREE:[LAST:[Yadgarbov],FIRST:[[place]],PHONE:[phone]],FAX:[(   [phone]    -],ADDRESS:[[address] [place] [address]  Pulmonary],FOLLOWUP:[1 week],ESTABLISHEDPATIENT:[T]]

## 2020-09-18 NOTE — DISCHARGE NOTE PROVIDER - NSDCFUADDAPPT_GEN_ALL_CORE_FT
Please follow up with your pulmonologist on discharge, an appt has been made for Friday 9/25 at 11:15 AM.

## 2020-09-18 NOTE — H&P ADULT - PROBLEM SELECTOR PLAN 5
VTE prophylaxis: sub Q heparin  Diet: DASH/TLC  Dispo: pending infection workup -Holding ARBs in the setting of MAX. Patient takes Olmesartan at home

## 2020-09-18 NOTE — ED ADULT NURSE REASSESSMENT NOTE - NS ED NURSE REASSESS COMMENT FT1
Pt awake, alert and oriented x 4 denies pain, denies chest pain or shortness of breath.   Remains on cardiac monitor and pulse ox with VSS and NSR.   Respirations even and unlabored.   IV site unremarkable.  Pt turned and repositioned in bed - no skin breakdown noted.  will continue to monitor closely.

## 2020-09-18 NOTE — H&P ADULT - PROBLEM SELECTOR PLAN 3
Hx of smoking, not on home O2  -Supplemental O2 via nasal cannula  -Duonebs q6h  -Prednisone 20mg BID starting tomorrow. S/p IV solumedrol 125mg in the ED  -Vitals q4h

## 2020-09-18 NOTE — H&P ADULT - PROBLEM SELECTOR PLAN 1
Meets SIRS criteria: leukocytosis, tachycardia, fevers, Lactate previously elevated (2.7). Likely secondary to pneumonia. Also suspicious for UTI due to history of increased urinary frequency and urgency  -Continue antibiotics: azithromycin and ceftriaxone  -Will switch BiPAP to nasal cannula  -F/u blood cultures  -F/u UA, urine legionella  -RVP negative, COVID negative   -Trend WBC  -Vitals q4h

## 2020-09-18 NOTE — H&P ADULT - ASSESSMENT
The patient is a 69 year old man with PMH of COPD not on home oxygen presenting with SOB and fevers concerning for pneumonia. Chest xray showing left lower lung opacity. Breathing status improved on BiPAP. The patient is a 69 year old man with PMH of COPD not on home oxygen, HTN, HLD, essential tremor, presenting with SOB and fevers concerning for pneumonia. Chest xray showing left lower lung opacity. Breathing status improved on BiPAP.

## 2020-09-18 NOTE — DISCHARGE NOTE PROVIDER - CARE PROVIDER_API CALL
Sita Vega  Internal Medicine  [address] [place] [address]  Phone: ([phone]  Fax: ()-  Established Patient  Follow Up Time: 2 weeks    Trenton Mehta  [address] [place] [address]  Pulmonary  Phone: [phone]  Fax: (   )    -  Established Patient  Follow Up Time: 1 week

## 2020-09-18 NOTE — H&P ADULT - NSHPSOCIALHISTORY_GEN_ALL_CORE
Lives with wife. Retired . Smoking history: 1 PPD for 35 years, 4 cigarettes per day for the past 6 months. No alcohol use, no illicit drug use.

## 2020-09-18 NOTE — DISCHARGE NOTE PROVIDER - NSDCMRMEDTOKEN_GEN_ALL_CORE_FT
aspirin 81 mg oral tablet:   Benicar 40 mg oral tablet: 1 tab(s) orally once a day  pravastatin 20 mg oral tablet: 1 tab(s) orally once a day  propranolol 120 mg oral capsule, extended release: 1 cap(s) orally once a day  Trelegy Ellipta inhalation powder: 1 puff(s) inhaled once a day  Vitamin B12 1000 mcg/mL injectable solution: injectable 2 times a month  Vitamin D3 50,000 intl units (1250 mcg) oral capsule: orally once a week   aspirin 81 mg oral tablet:   Benicar 40 mg oral tablet: 1 tab(s) orally once a day  levoFLOXacin 750 mg oral tablet: 1 tab(s) orally once a day   pravastatin 20 mg oral tablet: 1 tab(s) orally once a day  predniSONE 20 mg oral tablet: 1 tab(s) orally once a day  propranolol 120 mg oral capsule, extended release: 1 cap(s) orally once a day  Trelegy Ellipta inhalation powder: 1 puff(s) inhaled once a day  Vitamin B12 1000 mcg/mL injectable solution: injectable 2 times a month  Vitamin D3 50,000 intl units (1250 mcg) oral capsule: orally once a week

## 2020-09-18 NOTE — H&P ADULT - NSICDXPASTMEDICALHX_GEN_ALL_CORE_FT
PAST MEDICAL HISTORY:  COPD (chronic obstructive pulmonary disease)     Essential tremor      PAST MEDICAL HISTORY:  COPD (chronic obstructive pulmonary disease)     Essential tremor     Hyperlipidemia     Hypertension

## 2020-09-18 NOTE — H&P ADULT - NSICDXFAMILYHX_GEN_ALL_CORE_FT
FAMILY HISTORY:  Family history of diabetes mellitus in father  Family history of myocardial infarction, Mother

## 2020-09-18 NOTE — H&P ADULT - NSHPLABSRESULTS_GEN_ALL_CORE
11.4   26.21 )-----------( 354      ( 18 Sep 2020 08:13 )             35.4     09-18    138  |  103  |  46<H>  ----------------------------<  158<H>  4.8   |  21<L>  |  1.76<H>    Ca    8.6      18 Sep 2020 08:13  Phos  4.2     09-18  Mg     2.4     09-18    TPro  6.9  /  Alb  2.6<L>  /  TBili  0.3  /  DBili  x   /  AST  26  /  ALT  38  /  AlkPhos  86  09-18    Blood Gas Venous Comprehensive (09.18.20 @ 08:13)    Blood Gas Venous - Lactate: 1.0: Please note updated reference range. mmol/L    Blood Gas Venous - Chloride: 115 mmol/L    Blood Gas Venous - Creatinine: 1.70 mg/dL    pH, Venous: 7.37 pH    pCO2, Venous: 36 mmHg    pO2, Venous: 119 mmHg    HCO3, Venous: 21 mmol/L    Respiratory Viral/Bacti Detection by LEIGH (09.18.20 @ 05:30)    Adenovirus (RapRVP): Not Detected    Influenza A (RapRVP): Not Detected    Influenza AH1 2009 (RapRVP): Not Detected    Influenza AH1 (RapRVP): Not Detected    Influenza AH3 (RapRVP): Not Detected    Influenza B (RapRVP): Not Detected    Parainfluenza 1 (RapRVP): Not Detected    Parainfluenza 2 (RapRVP): Not Detected    Parainfluenza 3 (RapRVP): Not Detected    Parainfluenza 4 (RapRVP): Not Detected    Resp Syncytial Virus (RapRVP): Not Detected    Chlamydia pneumoniae (RapRVP): Not Detected    Mycoplasma pneumoniae (RapRVP): Not Detected    Entero/Rhinovirus (RapRVP): Not Detected    hMPV (RapRVP): Not Detected    Coronavirus (229E,HKU1,NL63,OC43): Not Detected    COVID-19 PCR . (09.17.20 @ 23:54)    COVID-19 PCR: NotDetec    Ferritin, Serum (09.17.20 @ 23:15)    Ferritin, Serum: 2457 ng/mL    Lactate Dehydrogenase, Serum (09.17.20 @ 23:15)    Lactate Dehydrogenase, Serum: 209 U/L    Procalcitonin, Serum (09.17.20 @ 23:15)    Procalcitonin, Serum: 0.35: Procalcitonin (PCT) Interpretation (ng/mL) - Diagnosis of  systemic bacterial infection/sepsis:  PCT < 0.5: Systemic infection (sepsis) is not likely and  risk for progression to severe systemic infection is low.  Local bacterial infection is possible. If early sepsis is  suspected clinically, PCT should be re-assessed in 6-24  hours.    IMAGING  < from: Xray Chest 1 View- PORTABLE-Urgent (Xray Chest 1 View- PORTABLE-Urgent .) (09.18.20 @ 00:54) >    EXAM:  XR CHEST PORTABLE URGENT 1V      PROCEDURE DATE:  Sep 18 2020     INTERPRETATION:  CLINICAL INFORMATION: Shortness of breath, fever.    TECHNIQUE: Single frontal radiograph of the chest 9/18/2020.    COMPARISON: Chest radiograph 2/13/2013.    FINDINGS:  Left lower lung patchy opacity, compatible with pneumonia.  No pleural effusion. No pneumothorax.  Cardiac size cannot accurately be assessed in this projection.    IMPRESSION: Left lower lung patchy opacity, compatible with pneumonia.      < end of copied text > 11.4   26.21 )-----------( 354      ( 18 Sep 2020 08:13 )             35.4     09-18    138  |  103  |  46<H>  ----------------------------<  158<H>  4.8   |  21<L>  |  1.76<H>    Ca    8.6      18 Sep 2020 08:13  Phos  4.2     09-18  Mg     2.4     09-18    TPro  6.9  /  Alb  2.6<L>  /  TBili  0.3  /  DBili  x   /  AST  26  /  ALT  38  /  AlkPhos  86  09-18    Blood Gas Venous Comprehensive (09.18.20 @ 08:13)    Blood Gas Venous - Lactate: 1.0: Please note updated reference range. mmol/L    Blood Gas Venous - Chloride: 115 mmol/L    Blood Gas Venous - Creatinine: 1.70 mg/dL    pH, Venous: 7.37 pH    pCO2, Venous: 36 mmHg    pO2, Venous: 119 mmHg    HCO3, Venous: 21 mmol/L    Respiratory Viral/Bacti Detection by LEIGH (09.18.20 @ 05:30)    Adenovirus (RapRVP): Not Detected    Influenza A (RapRVP): Not Detected    Influenza AH1 2009 (RapRVP): Not Detected    Influenza AH1 (RapRVP): Not Detected    Influenza AH3 (RapRVP): Not Detected    Influenza B (RapRVP): Not Detected    Parainfluenza 1 (RapRVP): Not Detected    Parainfluenza 2 (RapRVP): Not Detected    Parainfluenza 3 (RapRVP): Not Detected    Parainfluenza 4 (RapRVP): Not Detected    Resp Syncytial Virus (RapRVP): Not Detected    Chlamydia pneumoniae (RapRVP): Not Detected    Mycoplasma pneumoniae (RapRVP): Not Detected    Entero/Rhinovirus (RapRVP): Not Detected    hMPV (RapRVP): Not Detected    Coronavirus (229E,HKU1,NL63,OC43): Not Detected    COVID-19 PCR . (09.17.20 @ 23:54)    COVID-19 PCR: NotDetec    Ferritin, Serum (09.17.20 @ 23:15)    Ferritin, Serum: 2457 ng/mL    Lactate Dehydrogenase, Serum (09.17.20 @ 23:15)    Lactate Dehydrogenase, Serum: 209 U/L    Procalcitonin, Serum (09.17.20 @ 23:15)    Procalcitonin, Serum: 0.35: Procalcitonin (PCT) Interpretation (ng/mL) - Diagnosis of  systemic bacterial infection/sepsis:  PCT < 0.5: Systemic infection (sepsis) is not likely and  risk for progression to severe systemic infection is low.  Local bacterial infection is possible. If early sepsis is  suspected clinically, PCT should be re-assessed in 6-24  hours.    IMAGING  < from: Xray Chest 1 View- PORTABLE-Urgent (Xray Chest 1 View- PORTABLE-Urgent .) (09.18.20 @ 00:54) >    EXAM:  XR CHEST PORTABLE URGENT 1V      PROCEDURE DATE:  Sep 18 2020     INTERPRETATION:  CLINICAL INFORMATION: Shortness of breath, fever.    TECHNIQUE: Single frontal radiograph of the chest 9/18/2020.    COMPARISON: Chest radiograph 2/13/2013.    FINDINGS:  Left lower lung patchy opacity, compatible with pneumonia.  No pleural effusion. No pneumothorax.  Cardiac size cannot accurately be assessed in this projection.    IMPRESSION: Left lower lung patchy opacity, compatible with pneumonia.      < end of copied text >      EKG tracing reviewed and interpreted by me: Sinus tach, incomplete right bundle branch block

## 2020-09-18 NOTE — H&P ADULT - HISTORY OF PRESENT ILLNESS
The patient is a 69 year old man with PMH of COPD not on home oxygen presenting with SOB and fevers. The shortness of breath began approximately 1 weeks ago with fevers beginning 4 days ago with chills and night sweats. Fevers were partially relieved with Tylenol. He reports a dry cough. At his baseline he has a cough, however he reports increased severity in this cough causing rib pain but no chest pain. Shortness of breath is not worse when lying flat but he does endorse poor sleep for a week. He also reports not having a good appetite over the past few days. He had pneumonia 4 times in the past, most recently 5 years ago. No travel history and no sick contacts. He states that he rarely leaves the house. He also report increased urinary frequency and urgency, no burning or pain with urination and no urinary incontinence and no hematuria. Denies abdominal pain, diarrhea, bloody or black stools, nausea, or vomiting.     ED course: Vitals- Tmax 103.1, , /76, RR 25, O2 sat 87% on room air, 98% on BiPAP. MICU consulted for new BiPAP. s/p azithromycin, ceftriaxone, IV solumedrol 125mg, 1L NS bolus The patient is a 69 year old man with PMH of COPD not on home oxygen presenting with SOB and fevers. The shortness of breath began approximately 1 weeks ago with fevers beginning 4 days ago with chills and night sweats. Fevers were partially relieved with Tylenol. He reports a dry cough. At his baseline he has a cough, however he reports increased severity in this cough causing rib pain but no chest pain. Shortness of breath is not worse when lying flat but he does endorse poor sleep for a week. He also reports not having a good appetite over the past few days. He had pneumonia 4 times in the past, most recently 5 years ago. No travel history and no sick contacts. He states that he rarely leaves the house. He also reports increased urinary frequency and urgency, no burning or pain with urination and no urinary incontinence and no hematuria. Denies abdominal pain, diarrhea, bloody or black stools, nausea, or vomiting.     ED course: Vitals- Tmax 103.1, , /76, RR 25, O2 sat 87% on room air, 98% on BiPAP. MICU consulted for new BiPAP. s/p azithromycin, ceftriaxone, IV solumedrol 125mg, 1L NS bolus The patient is a 69 year old man with PMH of COPD not on home oxygen, HTN, HLD, essential tremor, presenting with SOB and fevers. The shortness of breath began approximately 1 weeks ago with fevers beginning 4 days ago with chills and night sweats. Fevers were partially relieved with Tylenol. He reports a dry cough. At his baseline he has a cough, however he reports increased severity in this cough causing rib pain but no chest pain. Shortness of breath is not worse when lying flat but he does endorse poor sleep for a week. He also reports not having a good appetite over the past few days. He had pneumonia 4 times in the past, most recently 5 years ago. No travel history and no sick contacts. He states that he rarely leaves the house. He also reports increased urinary frequency and urgency, no burning or pain with urination and no urinary incontinence and no hematuria. Denies abdominal pain, diarrhea, bloody or black stools, nausea, or vomiting.     ED course: Vitals- Tmax 103.1, , /76, RR 25, O2 sat 87% on room air, 98% on BiPAP. MICU consulted for new BiPAP. s/p azithromycin, ceftriaxone, IV solumedrol 125mg, 1L NS bolus

## 2020-09-18 NOTE — CONSULT NOTE ADULT - ASSESSMENT
69 year old male with COPD (not on home O2) and current smoker presents to the ED with sepsis 2/2 LLL pneumonia with hypoxia and tachypnea placed on bipap.    #New BIPAP  -pt placed on bipap for hypoxia and tachypnea now with improvement  -wean as tolerated to nasal canula/nonrebreather  -c/w LLL pneumonia treatment    NOT a MICU candidate at this time.  Re-consult as needed.    Christen Cast, PGY 3  [phone]

## 2020-09-18 NOTE — DISCHARGE NOTE PROVIDER - HOSPITAL COURSE
HPI: The patient is a 69 year old man with PMH of COPD not on home oxygen, HTN, HLD, essential tremor, presenting with SOB and fevers. The shortness of breath began approximately 1 weeks ago with fevers beginning 4 days ago with chills and night sweats. Fevers were partially relieved with Tylenol. He reports a dry cough. At his baseline he has a cough, however he reports increased severity in this cough causing rib pain but no chest pain. Shortness of breath is not worse when lying flat but he does endorse poor sleep for a week. He also reports not having a good appetite over the past few days. He had pneumonia 4 times in the past, most recently 5 years ago. No travel history and no sick contacts. He states that he rarely leaves the house. He also reports increased urinary frequency and urgency, no burning or pain with urination and no urinary incontinence and no hematuria. Denies abdominal pain, diarrhea, bloody or black stools, nausea, or vomiting.     ED course: Vitals- Tmax 103.1, , /76, RR 25, O2 sat 87% on room air, 98% on BiPAP. MICU consulted for new BiPAP. s/p azithromycin, ceftriaxone, IV solumedrol 125mg, 1L NS bolus HPI: The patient is a 69 year old man with PMH of COPD not on home oxygen, HTN, HLD, essential tremor, presenting with SOB and fevers. The shortness of breath began approximately 1 weeks ago with fevers beginning 4 days ago with chills and night sweats. Fevers were partially relieved with Tylenol. He reports a dry cough. At his baseline he has a cough, however he reports increased severity in this cough causing rib pain but no chest pain. Shortness of breath is not worse when lying flat but he does endorse poor sleep for a week. He also reports not having a good appetite over the past few days. He had pneumonia 4 times in the past, most recently 5 years ago. No travel history and no sick contacts. He states that he rarely leaves the house. He also reports increased urinary frequency and urgency, no burning or pain with urination and no urinary incontinence and no hematuria. Denies abdominal pain, diarrhea, bloody or black stools, nausea, or vomiting.     ED course: Vitals- Tmax 103.1, , /76, RR 25, O2 sat 87% on room air, 98% on BiPAP. MICU consulted for new BiPAP. s/p azithromycin, ceftriaxone, IV solumedrol 125mg, 1L NS bolus    Hospital course: The patient's chest xray showed a left lower lung opacity concerning for pneumonia. He was started on azithromycin and ceftriaxone for community acquired pneumonia. His BiPAP was removed and switched to nasal cannula. His breathing status continued to improve. On 9/19 he was started on prednisone 20mg BID.            HPI: The patient is a 69 year old man with PMH of COPD not on home oxygen, HTN, HLD, essential tremor, presenting with SOB and fevers. The shortness of breath began approximately 1 weeks ago with fevers beginning 4 days ago with chills and night sweats. Fevers were partially relieved with Tylenol. He reports a dry cough. At his baseline he has a cough, however he reports increased severity in this cough causing rib pain but no chest pain. Shortness of breath is not worse when lying flat but he does endorse poor sleep for a week. He also reports not having a good appetite over the past few days. He had pneumonia 4 times in the past, most recently 5 years ago. No travel history and no sick contacts. He states that he rarely leaves the house. He also reports increased urinary frequency and urgency, no burning or pain with urination and no urinary incontinence and no hematuria. Denies abdominal pain, diarrhea, bloody or black stools, nausea, or vomiting.     ED course: Vitals- Tmax 103.1, , /76, RR 25, O2 sat 87% on room air, 98% on BiPAP. MICU consulted for new BiPAP. s/p azithromycin, ceftriaxone, IV solumedrol 125mg, 1L NS bolus    Hospital course: The patient's chest xray showed a left lower lung opacity concerning for pneumonia. He was started on azithromycin and ceftriaxone. His BiPAP was removed and switched to nasal cannula. His breathing status continued to improve and he was weaned to RA. On 9/19 he was started on prednisone 20mg BID, tapered to QD. PT evaluated pt, no needs. HPI: The patient is a 69 year old man with PMH of COPD not on home oxygen, HTN, HLD, essential tremor, presenting with SOB and fevers. The shortness of breath began approximately 1 weeks ago with fevers beginning 4 days ago with chills and night sweats. Fevers were partially relieved with Tylenol. He reports a dry cough. At his baseline he has a cough, however he reports increased severity in this cough causing rib pain but no chest pain. Shortness of breath is not worse when lying flat but he does endorse poor sleep for a week. He also reports not having a good appetite over the past few days. He had pneumonia 4 times in the past, most recently 5 years ago. No travel history and no sick contacts. He states that he rarely leaves the house. He also reports increased urinary frequency and urgency, no burning or pain with urination and no urinary incontinence and no hematuria. Denies abdominal pain, diarrhea, bloody or black stools, nausea, or vomiting.     ED course: Vitals- Tmax 103.1, , /76, RR 25, O2 sat 87% on room air, 98% on BiPAP. MICU consulted for new BiPAP. s/p azithromycin, ceftriaxone, IV solumedrol 125mg, 1L NS bolus    Hospital course: The patient's chest xray showed a left lower lung opacity concerning for pneumonia. He was started on azithromycin and ceftriaxone. His BiPAP was removed and switched to nasal cannula. His breathing status continued to improve and he was weaned to RA. On 9/19 he was started on prednisone 20mg BID, tapered to QD. PT evaluated pt, no needs. On 9/21, he reports improvement in his symptoms with no shortness of breath and decreased cough. He is tolerating a regular diet, ambulating without difficulty, and voiding and stooling appropriately. HPI: The patient is a 69 year old man with PMH of COPD not on home oxygen, HTN, HLD, essential tremor, presenting with SOB and fevers.     ED course: Vitals- Tmax 103.1, , /76, RR 25, O2 sat 87% on room air, 98% on BiPAP. CXR showed a left lower opacity concerning for pneumonia. MICU consulted for new BiPAP. s/p azithromycin, ceftriaxone, IV solumedrol 125mg, 1L NS bolus    Hospital course: Patient w/ sepsis present on admission d/t L sided pneumonia. He was started on azithromycin and ceftriaxone. Acute hypoxic respiratory failure d/t COPD exacerbation and Pneumonia. His BiPAP was removed and switched to nasal cannula. Patient was started on duonebs ATC and Prednisone 20mg BID for COPD exacerbation. His breathing status continued to improve and he was weaned to RA. Prednisone was tapered to 20mg daily and will continue for 5 more days after discharge. Antibiotics were transitioned to PO Levaquin. PT evaluated pt, no needs. Hospital course also complicated by MAX on admission. MAX likely pre-renal and improved w/ IVF, ARB was held. On 9/21, he reports improvement in his symptoms with no shortness of breath and decreased cough. He is tolerating a regular diet, ambulating without difficulty and saturating > 90% on RA.

## 2020-09-19 DIAGNOSIS — J15.9 UNSPECIFIED BACTERIAL PNEUMONIA: ICD-10-CM

## 2020-09-19 LAB
ALBUMIN SERPL ELPH-MCNC: 2.7 G/DL — LOW (ref 3.3–5)
ALP SERPL-CCNC: 95 U/L — SIGNIFICANT CHANGE UP (ref 40–120)
ALT FLD-CCNC: 44 U/L — HIGH (ref 4–41)
ANION GAP SERPL CALC-SCNC: 14 MMO/L — SIGNIFICANT CHANGE UP (ref 7–14)
AST SERPL-CCNC: 33 U/L — SIGNIFICANT CHANGE UP (ref 4–40)
BASOPHILS # BLD AUTO: 0.05 K/UL — SIGNIFICANT CHANGE UP (ref 0–0.2)
BASOPHILS NFR BLD AUTO: 0.2 % — SIGNIFICANT CHANGE UP (ref 0–2)
BILIRUB SERPL-MCNC: < 0.2 MG/DL — LOW (ref 0.2–1.2)
BUN SERPL-MCNC: 57 MG/DL — HIGH (ref 7–23)
CALCIUM SERPL-MCNC: 8.9 MG/DL — SIGNIFICANT CHANGE UP (ref 8.4–10.5)
CHLORIDE SERPL-SCNC: 103 MMOL/L — SIGNIFICANT CHANGE UP (ref 98–107)
CHLORIDE UR-SCNC: 51 MMOL/L — SIGNIFICANT CHANGE UP
CO2 SERPL-SCNC: 22 MMOL/L — SIGNIFICANT CHANGE UP (ref 22–31)
CREAT SERPL-MCNC: 1.48 MG/DL — HIGH (ref 0.5–1.3)
EOSINOPHIL # BLD AUTO: 0 K/UL — SIGNIFICANT CHANGE UP (ref 0–0.5)
EOSINOPHIL NFR BLD AUTO: 0 % — SIGNIFICANT CHANGE UP (ref 0–6)
GLUCOSE SERPL-MCNC: 219 MG/DL — HIGH (ref 70–99)
HCT VFR BLD CALC: 34.7 % — LOW (ref 39–50)
HGB BLD-MCNC: 11.4 G/DL — LOW (ref 13–17)
IMM GRANULOCYTES NFR BLD AUTO: 3.8 % — HIGH (ref 0–1.5)
LYMPHOCYTES # BLD AUTO: 1.47 K/UL — SIGNIFICANT CHANGE UP (ref 1–3.3)
LYMPHOCYTES # BLD AUTO: 6.5 % — LOW (ref 13–44)
MAGNESIUM SERPL-MCNC: 2.6 MG/DL — SIGNIFICANT CHANGE UP (ref 1.6–2.6)
MANUAL SMEAR VERIFICATION: SIGNIFICANT CHANGE UP
MCHC RBC-ENTMCNC: 29.4 PG — SIGNIFICANT CHANGE UP (ref 27–34)
MCHC RBC-ENTMCNC: 32.9 % — SIGNIFICANT CHANGE UP (ref 32–36)
MCV RBC AUTO: 89.4 FL — SIGNIFICANT CHANGE UP (ref 80–100)
MONOCYTES # BLD AUTO: 1.6 K/UL — HIGH (ref 0–0.9)
MONOCYTES NFR BLD AUTO: 7.1 % — SIGNIFICANT CHANGE UP (ref 2–14)
NEUTROPHILS # BLD AUTO: 18.66 K/UL — HIGH (ref 1.8–7.4)
NEUTROPHILS NFR BLD AUTO: 82.4 % — HIGH (ref 43–77)
NRBC # FLD: 0 K/UL — SIGNIFICANT CHANGE UP (ref 0–0)
PHOSPHATE SERPL-MCNC: 4.4 MG/DL — SIGNIFICANT CHANGE UP (ref 2.5–4.5)
PLATELET # BLD AUTO: 390 K/UL — SIGNIFICANT CHANGE UP (ref 150–400)
PMV BLD: 10.6 FL — SIGNIFICANT CHANGE UP (ref 7–13)
POTASSIUM SERPL-MCNC: 4.5 MMOL/L — SIGNIFICANT CHANGE UP (ref 3.5–5.3)
POTASSIUM SERPL-SCNC: 4.5 MMOL/L — SIGNIFICANT CHANGE UP (ref 3.5–5.3)
PROT SERPL-MCNC: 6.6 G/DL — SIGNIFICANT CHANGE UP (ref 6–8.3)
RBC # BLD: 3.88 M/UL — LOW (ref 4.2–5.8)
RBC # FLD: 15.2 % — HIGH (ref 10.3–14.5)
SODIUM SERPL-SCNC: 139 MMOL/L — SIGNIFICANT CHANGE UP (ref 135–145)
SODIUM UR-SCNC: 36 MMOL/L — SIGNIFICANT CHANGE UP
WBC # BLD: 22.63 K/UL — HIGH (ref 3.8–10.5)
WBC # FLD AUTO: 22.63 K/UL — HIGH (ref 3.8–10.5)

## 2020-09-19 PROCEDURE — 99233 SBSQ HOSP IP/OBS HIGH 50: CPT | Mod: GC

## 2020-09-19 RX ORDER — PROPRANOLOL HCL 160 MG
120 CAPSULE, EXTENDED RELEASE 24HR ORAL DAILY
Refills: 0 | Status: DISCONTINUED | OUTPATIENT
Start: 2020-09-19 | End: 2020-09-21

## 2020-09-19 RX ORDER — LANOLIN ALCOHOL/MO/W.PET/CERES
3 CREAM (GRAM) TOPICAL AT BEDTIME
Refills: 0 | Status: DISCONTINUED | OUTPATIENT
Start: 2020-09-19 | End: 2020-09-21

## 2020-09-19 RX ADMIN — AZITHROMYCIN 255 MILLIGRAM(S): 500 TABLET, FILM COATED ORAL at 23:47

## 2020-09-19 RX ADMIN — Medication 20 MILLIGRAM(S): at 18:20

## 2020-09-19 RX ADMIN — HEPARIN SODIUM 5000 UNIT(S): 5000 INJECTION INTRAVENOUS; SUBCUTANEOUS at 18:20

## 2020-09-19 RX ADMIN — Medication 3 MILLIGRAM(S): at 23:47

## 2020-09-19 RX ADMIN — ATORVASTATIN CALCIUM 10 MILLIGRAM(S): 80 TABLET, FILM COATED ORAL at 21:19

## 2020-09-19 RX ADMIN — Medication 120 MILLIGRAM(S): at 18:20

## 2020-09-19 RX ADMIN — ALBUTEROL 2 PUFF(S): 90 AEROSOL, METERED ORAL at 21:18

## 2020-09-19 RX ADMIN — AZITHROMYCIN 255 MILLIGRAM(S): 500 TABLET, FILM COATED ORAL at 01:28

## 2020-09-19 RX ADMIN — Medication 81 MILLIGRAM(S): at 11:47

## 2020-09-19 RX ADMIN — Medication 20 MILLIGRAM(S): at 05:25

## 2020-09-19 RX ADMIN — CEFTRIAXONE 100 MILLIGRAM(S): 500 INJECTION, POWDER, FOR SOLUTION INTRAMUSCULAR; INTRAVENOUS at 23:09

## 2020-09-19 RX ADMIN — ALBUTEROL 2 PUFF(S): 90 AEROSOL, METERED ORAL at 10:19

## 2020-09-19 RX ADMIN — HEPARIN SODIUM 5000 UNIT(S): 5000 INJECTION INTRAVENOUS; SUBCUTANEOUS at 05:25

## 2020-09-19 RX ADMIN — ALBUTEROL 2 PUFF(S): 90 AEROSOL, METERED ORAL at 17:54

## 2020-09-19 RX ADMIN — ALBUTEROL 2 PUFF(S): 90 AEROSOL, METERED ORAL at 05:25

## 2020-09-19 NOTE — PROGRESS NOTE ADULT - ASSESSMENT
The patient is a 69 year old man with PMH of COPD not on home oxygen, HTN, HLD, essential tremor, presenting with SOB and fevers concerning for pneumonia. Chest xray showing left lower lung opacity. Breathing status improved on BiPAP. The patient is a 69 year old man with PMH of COPD not on home oxygen, HTN, HLD, essential tremor, presenting with SOB and fevers concerning for pneumonia. Chest xray showing left lower lung opacity. Breathing status greatly improved.

## 2020-09-19 NOTE — PROGRESS NOTE ADULT - PROBLEM SELECTOR PROBLEM 7
Topical Steroids Counseling: I discussed with the patient that prolonged use of topical steroids can result in the increased appearance of superficial blood vessels (telangiectasias), lightening (hypopigmentation) and thinning of the skin (atrophy). Patient understands to avoid using high potency steroids in skin folds, the groin or the face. The patient verbalized understanding of the proper use and possible adverse effects of topical steroids. All of the patient's questions and concerns were addressed. Detail Level: Zone Preventative health care

## 2020-09-19 NOTE — PROGRESS NOTE ADULT - SUBJECTIVE AND OBJECTIVE BOX
Meryl Tadeo (sub I)  Pager: [phone]    The patient is a 69 year old man with PMH of COPD not on home oxygen, HTN, HLD, essential tremor, presenting with SOB and fevers.     OVERNIGHT/SUBJECTIVE: No acute events overnight.       MEDICATIONS  (STANDING):  ALBUTerol    90 MICROgram(s) HFA Inhaler 2 Puff(s) Inhalation every 6 hours  aspirin  chewable 81 milliGRAM(s) Oral daily  atorvastatin 10 milliGRAM(s) Oral at bedtime  azithromycin  IVPB 500 milliGRAM(s) IV Intermittent every 24 hours  cefTRIAXone   IVPB 1000 milliGRAM(s) IV Intermittent every 24 hours  heparin   Injectable 5000 Unit(s) SubCutaneous every 12 hours  predniSONE   Tablet 20 milliGRAM(s) Oral two times a day  sodium chloride 0.9%. 1000 milliLiter(s) (75 mL/Hr) IV Continuous <Continuous>      MEDICATIONS  (PRN):  acetaminophen    Suspension .. 650 milliGRAM(s) Oral every 6 hours PRN Mild Pain (1 - 3), Moderate Pain (4 - 6)      PHYSICAL EXAM  Vital Signs Last 24 Hrs  T(C): 36.3 (19 Sep 2020 05:23), Max: 37.1 (19 Sep 2020 01:26)  T(F): 97.3 (19 Sep 2020 05:23), Max: 98.7 (19 Sep 2020 01:26)  HR: 77 (19 Sep 2020 05:23) (60 - 89)  BP: 113/63 (19 Sep 2020 05:23) (110/72 - 129/74)  RR: 19 (19 Sep 2020 05:23) (18 - 20)  SpO2: 97% (19 Sep 2020 05:23) (95% - 98%)    Appearance: Well appearing, alert, interactive, on nasal cannula  HEENT: Extra ocular movements intact; oral mucosa moist  Neck: Supple, normal thyroid. No JVD.   Respiratory: +Left sided crackles. Minimal wheezing.   Cardiovascular: Regular rate and variability; Normal S1, S2; No murmurs, rubs, or gallops  Abdomen: Abdomen soft; no distension; no tenderness; no masses or organomegaly. Bowel sounds present  Extremities: Full range of motion, no erythema; no edema  Neurology: A&Ox3, verbalization clear and understandable; sensation grossly intact to touch, +bilateral hand tremor with arms outstretched   Skin: No rashes, no cyanosis    LABS                        11.4   22.63 )-----------( 390      ( 19 Sep 2020 07:00 )             34.7         138  |  103  |  46<H>  ----------------------------<  158<H>  4.8   |  21<L>  |  1.76<H>    Ca    8.6      18 Sep 2020 08:13  Phos  4.2       Mg     2.4         TPro  6.9  /  Alb  2.6<L>  /  TBili  0.3  /  DBili  x   /  AST  26  /  ALT  38  /  AlkPhos  86        Urinalysis Basic - ( 18 Sep 2020 23:15 )    Color: YELLOW / Appearance: Lt TURBID / S.024 / pH: 5.5  Gluc: NEGATIVE / Ketone: NEGATIVE  / Bili: NEGATIVE / Urobili: NORMAL   Blood: NEGATIVE / Protein: 30 / Nitrite: NEGATIVE   Leuk Esterase: NEGATIVE / RBC: 3-5 / WBC 0-2   Sq Epi: OCC / Non Sq Epi: x / Bacteria: NEGATIVE    Legionella pneumophila Antigen, Urine (20 @ 02:10)    Legionella pneumophila Antigen, Urine: Negative    COVID-19 PCR . (20 @ 13:15)    COVID-19 PCR: NotDetec    COVID-19 PCR . (20 @ 23:54)    COVID-19 PCR: NotDetec:       Meryl Tadeo (sub I)  Pager: [phone]    The patient is a 69 year old man with PMH of COPD not on home oxygen, HTN, HLD, essential tremor, presenting with SOB and fevers.     OVERNIGHT/SUBJECTIVE: No acute events overnight. The patient states he feels much better. His breathing has greatly improved and he does not feel short of breath. He is able to walk to the bathroom without issues. He does not feel febrile, no chills or night sweats. No chest pain. He continues to have difficulty sleeping. Urinary frequency and urgency has decreased. Denies nausea, vomiting, diarrhea.       MEDICATIONS  (STANDING):  ALBUTerol    90 MICROgram(s) HFA Inhaler 2 Puff(s) Inhalation every 6 hours  aspirin  chewable 81 milliGRAM(s) Oral daily  atorvastatin 10 milliGRAM(s) Oral at bedtime  azithromycin  IVPB 500 milliGRAM(s) IV Intermittent every 24 hours  cefTRIAXone   IVPB 1000 milliGRAM(s) IV Intermittent every 24 hours  heparin   Injectable 5000 Unit(s) SubCutaneous every 12 hours  predniSONE   Tablet 20 milliGRAM(s) Oral two times a day  sodium chloride 0.9%. 1000 milliLiter(s) (75 mL/Hr) IV Continuous <Continuous>      MEDICATIONS  (PRN):  acetaminophen    Suspension .. 650 milliGRAM(s) Oral every 6 hours PRN Mild Pain (1 - 3), Moderate Pain (4 - 6)      PHYSICAL EXAM  Vital Signs Last 24 Hrs  T(C): 36.3 (19 Sep 2020 05:23), Max: 37.1 (19 Sep 2020 01:26)  T(F): 97.3 (19 Sep 2020 05:23), Max: 98.7 (19 Sep 2020 01:26)  HR: 77 (19 Sep 2020 05:23) (60 - 89)  BP: 113/63 (19 Sep 2020 05:23) (110/72 - 129/74)  RR: 19 (19 Sep 2020 05:23) (18 - 20)  SpO2: 97% (19 Sep 2020 05:23) (95% - 98%)    Appearance: Well appearing, alert, interactive, on nasal cannula  HEENT: Extra ocular movements intact; oral mucosa moist  Neck: Supple, normal thyroid. No JVD.   Respiratory: +Left sided coarse breath sounds. Minimal wheezing.   Cardiovascular: Regular rate and variability; Normal S1, S2; No murmurs, rubs, or gallops  Abdomen: Abdomen soft; no distension; no tenderness; no masses or organomegaly. Bowel sounds present  Extremities: Full range of motion, no erythema; no edema  Neurology: A&Ox3, verbalization clear and understandable; sensation grossly intact to touch, +bilateral hand tremor with arms outstretched   Skin: No rashes, no cyanosis    LABS                        11.4   22.63 )-----------( 390      ( 19 Sep 2020 07:00 )             34.7         139  |  103  |  57<H>  ----------------------------<  219<H>  4.5   |  22  |  1.48<H>    Ca    8.9      19 Sep 2020 07:00  Phos  4.4       Mg     2.6         TPro  6.6  /  Alb  2.7<L>  /  TBili  < 0.2<L>  /  DBili  x   /  AST  33  /  ALT  44<H>  /  AlkPhos  95          Urinalysis Basic - ( 18 Sep 2020 23:15 )    Color: YELLOW / Appearance: Lt TURBID / S.024 / pH: 5.5  Gluc: NEGATIVE / Ketone: NEGATIVE  / Bili: NEGATIVE / Urobili: NORMAL   Blood: NEGATIVE / Protein: 30 / Nitrite: NEGATIVE   Leuk Esterase: NEGATIVE / RBC: 3-5 / WBC 0-2   Sq Epi: OCC / Non Sq Epi: x / Bacteria: NEGATIVE    Legionella pneumophila Antigen, Urine (20 @ 02:10)    Legionella pneumophila Antigen, Urine: Negative    COVID-19 PCR . (20 @ 13:15)    COVID-19 PCR: NotDetec    COVID-19 PCR . (20 @ 23:54)    COVID-19 PCR: NotDetec:

## 2020-09-19 NOTE — PROGRESS NOTE ADULT - PROBLEM SELECTOR PLAN 4
Elevated BUN and Cr with BUN/Cr ratio 25.7. Likely prerenal in etiology. Patient reporting low PO intake  -IV normal saline  -Trend Cr  -PCP reports baseline creatinine of 1.4  -Bladder scan to assess for retention Elevated BUN and Cr with BUN/Cr ratio 38.5. Likely prerenal in etiology. Patient reporting low PO intake  -IV normal saline  -Trend Cr  -PCP reports baseline creatinine of 1.4  -Bladder scan to assess for retention

## 2020-09-19 NOTE — PROGRESS NOTE ADULT - PROBLEM SELECTOR PLAN 1
Meets SIRS criteria: leukocytosis, tachycardia, fevers, Lactate previously elevated (2.7). Likely secondary to pneumonia. Also suspicious for UTI due to history of increased urinary frequency and urgency  -Continue antibiotics: azithromycin and ceftriaxone  -Supplemental O2 via nasal cannula. Will attempt to titrate down  -F/u blood cultures  -UA negative for bacteria, positive for protein and hyaline casts  -Legionella negative  -RVP negative, COVID negative   -Trend WBC  -Vitals q4h Meets SIRS criteria: leukocytosis, tachycardia, fevers, Lactate previously elevated (2.7). Most likely secondary to bacterial pneumonia.   -Continue antibiotics: azithromycin and ceftriaxone  -Supplemental O2 via nasal cannula. Will attempt to titrate down  -F/u blood cultures  -UA negative for bacteria, positive for protein and hyaline casts  -Legionella negative  -RVP negative, COVID negative   -Trend WBC  -Vitals q4h Meets SIRS criteria: leukocytosis, tachycardia, fevers, Lactate previously elevated (2.7). Most likely secondary to bacterial pneumonia. Fevers resolved.   -Continue antibiotics: azithromycin and ceftriaxone  -Supplemental O2 via nasal cannula/ Titrate down to 3L  -F/u blood cultures  -UA negative for bacteria, positive for protein and hyaline casts  -Legionella negative  -RVP negative, COVID negative   -Trend WBC  -Vitals q4h

## 2020-09-19 NOTE — PROGRESS NOTE ADULT - PROBLEM SELECTOR PLAN 2
Chest xray showing left lower lobe opacity. Patient presenting with fevers and shortness of breath  -Continue antibiotics: azithromycin and ceftriaxone for treatment of CAP  -Supplemental O2 via nasal cannula. Will attempt to titrate down  -F/u blood cultures, legionella negative Chest xray showing new left lower lobe opacity suspicious for bacterial pneumonia. Patient presenting with fevers and shortness of breath  -Continue antibiotics: azithromycin and ceftriaxone for treatment of CAP  -Supplemental O2 via nasal cannula. Will attempt to titrate down  -F/u blood cultures, legionella negative Chest xray showing new left lower lobe opacity suspicious for bacterial pneumonia. Patient presenting with fevers and shortness of breath  -Continue antibiotics: azithromycin and ceftriaxone for treatment of CAP  -Supplemental O2 via nasal cannula. Titrate down to 3L  -F/u blood cultures, legionella negative

## 2020-09-20 LAB
ALBUMIN SERPL ELPH-MCNC: 2.7 G/DL — LOW (ref 3.3–5)
ALP SERPL-CCNC: 102 U/L — SIGNIFICANT CHANGE UP (ref 40–120)
ALT FLD-CCNC: 84 U/L — HIGH (ref 4–41)
ANION GAP SERPL CALC-SCNC: 14 MMO/L — SIGNIFICANT CHANGE UP (ref 7–14)
AST SERPL-CCNC: 53 U/L — HIGH (ref 4–40)
BASOPHILS # BLD AUTO: 0.1 K/UL — SIGNIFICANT CHANGE UP (ref 0–0.2)
BASOPHILS NFR BLD AUTO: 0.5 % — SIGNIFICANT CHANGE UP (ref 0–2)
BILIRUB SERPL-MCNC: < 0.2 MG/DL — LOW (ref 0.2–1.2)
BUN SERPL-MCNC: 55 MG/DL — HIGH (ref 7–23)
CALCIUM SERPL-MCNC: 8.8 MG/DL — SIGNIFICANT CHANGE UP (ref 8.4–10.5)
CHLORIDE SERPL-SCNC: 104 MMOL/L — SIGNIFICANT CHANGE UP (ref 98–107)
CO2 SERPL-SCNC: 22 MMOL/L — SIGNIFICANT CHANGE UP (ref 22–31)
CREAT SERPL-MCNC: 1.36 MG/DL — HIGH (ref 0.5–1.3)
EOSINOPHIL # BLD AUTO: 0.01 K/UL — SIGNIFICANT CHANGE UP (ref 0–0.5)
EOSINOPHIL NFR BLD AUTO: 0 % — SIGNIFICANT CHANGE UP (ref 0–6)
GLUCOSE SERPL-MCNC: 190 MG/DL — HIGH (ref 70–99)
HCT VFR BLD CALC: 36.1 % — LOW (ref 39–50)
HGB BLD-MCNC: 11.4 G/DL — LOW (ref 13–17)
IMM GRANULOCYTES NFR BLD AUTO: 5.5 % — HIGH (ref 0–1.5)
LYMPHOCYTES # BLD AUTO: 1.52 K/UL — SIGNIFICANT CHANGE UP (ref 1–3.3)
LYMPHOCYTES # BLD AUTO: 7 % — LOW (ref 13–44)
MAGNESIUM SERPL-MCNC: 2.5 MG/DL — SIGNIFICANT CHANGE UP (ref 1.6–2.6)
MCHC RBC-ENTMCNC: 28.4 PG — SIGNIFICANT CHANGE UP (ref 27–34)
MCHC RBC-ENTMCNC: 31.6 % — LOW (ref 32–36)
MCV RBC AUTO: 90 FL — SIGNIFICANT CHANGE UP (ref 80–100)
MONOCYTES # BLD AUTO: 1.6 K/UL — HIGH (ref 0–0.9)
MONOCYTES NFR BLD AUTO: 7.4 % — SIGNIFICANT CHANGE UP (ref 2–14)
NEUTROPHILS # BLD AUTO: 17.26 K/UL — HIGH (ref 1.8–7.4)
NEUTROPHILS NFR BLD AUTO: 79.6 % — HIGH (ref 43–77)
NRBC # FLD: 0 K/UL — SIGNIFICANT CHANGE UP (ref 0–0)
PHOSPHATE SERPL-MCNC: 4.3 MG/DL — SIGNIFICANT CHANGE UP (ref 2.5–4.5)
PLATELET # BLD AUTO: 474 K/UL — HIGH (ref 150–400)
PMV BLD: 10.5 FL — SIGNIFICANT CHANGE UP (ref 7–13)
POTASSIUM SERPL-MCNC: 4.8 MMOL/L — SIGNIFICANT CHANGE UP (ref 3.5–5.3)
POTASSIUM SERPL-SCNC: 4.8 MMOL/L — SIGNIFICANT CHANGE UP (ref 3.5–5.3)
PROT SERPL-MCNC: 6.5 G/DL — SIGNIFICANT CHANGE UP (ref 6–8.3)
RBC # BLD: 4.01 M/UL — LOW (ref 4.2–5.8)
RBC # FLD: 15.2 % — HIGH (ref 10.3–14.5)
SODIUM SERPL-SCNC: 140 MMOL/L — SIGNIFICANT CHANGE UP (ref 135–145)
WBC # BLD: 21.68 K/UL — HIGH (ref 3.8–10.5)
WBC # FLD AUTO: 21.68 K/UL — HIGH (ref 3.8–10.5)

## 2020-09-20 PROCEDURE — 99233 SBSQ HOSP IP/OBS HIGH 50: CPT | Mod: GC

## 2020-09-20 RX ADMIN — Medication 120 MILLIGRAM(S): at 17:57

## 2020-09-20 RX ADMIN — Medication 20 MILLIGRAM(S): at 05:53

## 2020-09-20 RX ADMIN — Medication 3 MILLIGRAM(S): at 22:07

## 2020-09-20 RX ADMIN — Medication 81 MILLIGRAM(S): at 12:32

## 2020-09-20 RX ADMIN — HEPARIN SODIUM 5000 UNIT(S): 5000 INJECTION INTRAVENOUS; SUBCUTANEOUS at 05:53

## 2020-09-20 RX ADMIN — ALBUTEROL 2 PUFF(S): 90 AEROSOL, METERED ORAL at 16:24

## 2020-09-20 RX ADMIN — ALBUTEROL 2 PUFF(S): 90 AEROSOL, METERED ORAL at 22:07

## 2020-09-20 RX ADMIN — ALBUTEROL 2 PUFF(S): 90 AEROSOL, METERED ORAL at 10:26

## 2020-09-20 RX ADMIN — ALBUTEROL 2 PUFF(S): 90 AEROSOL, METERED ORAL at 05:53

## 2020-09-20 RX ADMIN — CEFTRIAXONE 100 MILLIGRAM(S): 500 INJECTION, POWDER, FOR SOLUTION INTRAMUSCULAR; INTRAVENOUS at 22:08

## 2020-09-20 RX ADMIN — HEPARIN SODIUM 5000 UNIT(S): 5000 INJECTION INTRAVENOUS; SUBCUTANEOUS at 17:57

## 2020-09-20 RX ADMIN — Medication 20 MILLIGRAM(S): at 17:57

## 2020-09-20 RX ADMIN — ATORVASTATIN CALCIUM 10 MILLIGRAM(S): 80 TABLET, FILM COATED ORAL at 22:07

## 2020-09-20 NOTE — PROGRESS NOTE ADULT - ASSESSMENT
69M h/o COPD not on home oxygen, HTN, HLD, essential tremor admitted for dyspnea and fevers concerning for pneumonia. CXR showing LLL opacity. Breathing status greatly improved.

## 2020-09-20 NOTE — PROGRESS NOTE ADULT - PROBLEM SELECTOR PLAN 2
Chest xray showing new left lower lobe opacity suspicious for bacterial pneumonia. Patient presenting with fevers and shortness of breath  -Continue antibiotics: azithromycin and ceftriaxone for treatment of CAP  -Supplemental O2 via nasal cannula. Titrate down to 3L  -F/u blood cultures, legionella negative CXR showing new left lower lobe opacity suspicious for bacterial pneumonia. Patient presenting with fevers and shortness of breath  -Continue antibiotics: azithromycin and ceftriaxone for treatment of CAP  -Supplemental O2 via nasal cannula. Titrate down to 3L  -BCx NGTD, legionella negative CXR showing new left lower lobe opacity suspicious for bacterial pneumonia. Patient presenting with fevers and shortness of breath  -Continue antibiotics: azithromycin and ceftriaxone for treatment of CAP  -Supplemental O2 via nasal cannula. Titrate down to 3L  -BCx NGTD, legionella negative  -Guaifenesin 200mg q6h PRN congestion

## 2020-09-20 NOTE — PROGRESS NOTE ADULT - SUBJECTIVE AND OBJECTIVE BOX
PROGRESS NOTE:   Authored by Aaron Muniz MD PGY-2  Pager [phone] [facility], [address] [facility]   Please page [phone] after 7PM    Patient is a 69y old  Male who presents with a chief complaint of PNA (19 Sep 2020 07:57)      SUBJECTIVE / OVERNIGHT EVENTS:    ADDITIONAL REVIEW OF SYSTEMS:    MEDICATIONS  (STANDING):  ALBUTerol    90 MICROgram(s) HFA Inhaler 2 Puff(s) Inhalation every 6 hours  aspirin  chewable 81 milliGRAM(s) Oral daily  atorvastatin 10 milliGRAM(s) Oral at bedtime  azithromycin  IVPB 500 milliGRAM(s) IV Intermittent every 24 hours  cefTRIAXone   IVPB 1000 milliGRAM(s) IV Intermittent every 24 hours  heparin   Injectable 5000 Unit(s) SubCutaneous every 12 hours  melatonin 3 milliGRAM(s) Oral at bedtime  predniSONE   Tablet 20 milliGRAM(s) Oral two times a day  propranolol  milliGRAM(s) Oral daily  sodium chloride 0.9%. 1000 milliLiter(s) (75 mL/Hr) IV Continuous <Continuous>    MEDICATIONS  (PRN):  acetaminophen    Suspension .. 650 milliGRAM(s) Oral every 6 hours PRN Mild Pain (1 - 3), Moderate Pain (4 - 6)    I&O's Summary    19 Sep 2020 07:01  -  20 Sep 2020 07:00  --------------------------------------------------------  IN: 1300 mL / OUT: 0 mL / NET: 1300 mL    PHYSICAL EXAM:  Vital Signs Last 24 Hrs  T(C): 36.6 (20 Sep 2020 05:48), Max: 36.8 (19 Sep 2020 13:46)  T(F): 97.8 (20 Sep 2020 05:48), Max: 98.3 (19 Sep 2020 13:46)  HR: 69 (20 Sep 2020 05:48) (69 - 86)  BP: 117/63 (20 Sep 2020 05:48) (111/66 - 135/78)  BP(mean): --  RR: 20 (20 Sep 2020 05:48) (18 - 20)  SpO2: 100% (20 Sep 2020 05:48) (95% - 100%)    CONSTITUTIONAL: NAD, well-developed  RESPIRATORY: Normal respiratory effort; lungs are clear to auscultation bilaterally  CARDIOVASCULAR: Regular rate and rhythm, normal S1 and S2, no murmur/rub/gallop; No lower extremity edema; Peripheral pulses are 2+ bilaterally  ABDOMEN: Nontender to palpation, normoactive bowel sounds, no rebound/guarding  MUSCULOSKELETAL: no clubbing or cyanosis of digits; no joint swelling or tenderness to palpation  PSYCH: A+O to person, place, and time; affect appropriate    LABS:                        11.4   21.68 )-----------( 474      ( 20 Sep 2020 06:00 )             36.1     09-20    140  |  104  |  55<H>  ----------------------------<  190<H>  4.8   |  22  |  1.36<H>    Ca    8.8      20 Sep 2020 06:00  Phos  4.3     09-20  Mg     2.5     09-20    TPro  6.5  /  Alb  2.7<L>  /  TBili  < 0.2<L>  /  DBili  x   /  AST  53<H>  /  ALT  84<H>  /  AlkPhos  102  09-20      Urinalysis Basic - ( 18 Sep 2020 23:15 )    Color: YELLOW / Appearance: Lt TURBID / S.024 / pH: 5.5  Gluc: NEGATIVE / Ketone: NEGATIVE  / Bili: NEGATIVE / Urobili: NORMAL   Blood: NEGATIVE / Protein: 30 / Nitrite: NEGATIVE   Leuk Esterase: NEGATIVE / RBC: 3-5 / WBC 0-2   Sq Epi: OCC / Non Sq Epi: x / Bacteria: NEGATIVE        Culture - Blood (collected 18 Sep 2020 02:03)  Source: .Blood Blood-Venous  Preliminary Report (19 Sep 2020 03:02):    No growth to date.    Culture - Blood (collected 18 Sep 2020 02:03)  Source: .Blood Blood-Peripheral  Preliminary Report (19 Sep 2020 03:02):    No growth to date.    RADIOLOGY & ADDITIONAL TESTS:  Results Reviewed:   Imaging Personally Reviewed:  Electrocardiogram Personally Reviewed:    COORDINATION OF CARE:  Care Discussed with Consultants/Other Providers [Y/N]:  Prior or Outpatient Records Reviewed [Y/N]:   PROGRESS NOTE:   Authored by Aaron Muniz MD PGY-2  Pager [phone] [facility], [address] [address]   Please page [phone] after 7PM    Patient is a 69y old  Male who presents with a chief complaint of PNA (19 Sep 2020 07:57)      SUBJECTIVE / OVERNIGHT EVENTS:  Patient slept well overnight. No longer requiring O2. Occasionally has congestion and difficulty coughing up mucus. Currently denies dyspnea, chest pain, fever, chills, nausea, vomiting, constipation, and diarrhea.    ADDITIONAL REVIEW OF SYSTEMS:    MEDICATIONS  (STANDING):  ALBUTerol    90 MICROgram(s) HFA Inhaler 2 Puff(s) Inhalation every 6 hours  aspirin  chewable 81 milliGRAM(s) Oral daily  atorvastatin 10 milliGRAM(s) Oral at bedtime  azithromycin  IVPB 500 milliGRAM(s) IV Intermittent every 24 hours  cefTRIAXone   IVPB 1000 milliGRAM(s) IV Intermittent every 24 hours  heparin   Injectable 5000 Unit(s) SubCutaneous every 12 hours  melatonin 3 milliGRAM(s) Oral at bedtime  predniSONE   Tablet 20 milliGRAM(s) Oral two times a day  propranolol  milliGRAM(s) Oral daily  sodium chloride 0.9%. 1000 milliLiter(s) (75 mL/Hr) IV Continuous <Continuous>    MEDICATIONS  (PRN):  acetaminophen    Suspension .. 650 milliGRAM(s) Oral every 6 hours PRN Mild Pain (1 - 3), Moderate Pain (4 - 6)    I&O's Summary    19 Sep 2020 07:01  -  20 Sep 2020 07:00  --------------------------------------------------------  IN: 1300 mL / OUT: 0 mL / NET: 1300 mL    PHYSICAL EXAM:  Vital Signs Last 24 Hrs  T(C): 36.6 (20 Sep 2020 05:48), Max: 36.8 (19 Sep 2020 13:46)  T(F): 97.8 (20 Sep 2020 05:48), Max: 98.3 (19 Sep 2020 13:46)  HR: 69 (20 Sep 2020 05:48) (69 - 86)  BP: 117/63 (20 Sep 2020 05:48) (111/66 - 135/78)  BP(mean): --  RR: 20 (20 Sep 2020 05:48) (18 - 20)  SpO2: 100% (20 Sep 2020 05:48) (95% - 100%)    CONSTITUTIONAL: NAD, well-developed  RESPIRATORY: Normal respiratory effort; expiratory wheezes in left lower lung field  CARDIOVASCULAR: Regular rate and rhythm, normal S1 and S2, no murmur/rub/gallop; No lower extremity edema; Peripheral pulses are 2+ bilaterally  ABDOMEN: Nontender to palpation, normoactive bowel sounds, no rebound/guarding  MUSCULOSKELETAL: no clubbing or cyanosis of digits; no joint swelling or tenderness to palpation  PSYCH: A+O to person, place, and time; affect appropriate    LABS:             11.4   21.68 )-----------( 474      ( 20 Sep 2020 06:00 )             36.1     20 Sep 2020 06:00    140    |  104    |  55     ----------------------------<  190    4.8     |  22     |  1.36     Ca    8.8        20 Sep 2020 06:00  Phos  4.3       20 Sep 2020 06:00  Mg     2.5       20 Sep 2020 06:00    TPro  6.5    /  Alb  2.7    /  TBili  < 0.2  /  DBili  x      /  AST  53     /  ALT  84     /  AlkPhos  102    20 Sep 2020 06:00      LIVER FUNCTIONS - ( 20 Sep 2020 06:00 )  Alb: 2.7 g/dL / Pro: 6.5 g/dL / ALK PHOS: 102 u/L / ALT: 84 u/L / AST: 53 u/L / GGT: x           Urinalysis Basic - ( 18 Sep 2020 23:15 )    Color: YELLOW / Appearance: Lt TURBID / S.024 / pH: 5.5  Gluc: NEGATIVE / Ketone: NEGATIVE  / Bili: NEGATIVE / Urobili: NORMAL   Blood: NEGATIVE / Protein: 30 / Nitrite: NEGATIVE   Leuk Esterase: NEGATIVE / RBC: 3-5 / WBC 0-2   Sq Epi: OCC / Non Sq Epi: x / Bacteria: NEGATIVE    RADIOLOGY & ADDITIONAL TESTS:  Results Reviewed:   Imaging Personally Reviewed:  Electrocardiogram Personally Reviewed:    COORDINATION OF CARE:  Care Discussed with Consultants/Other Providers [Y/N]:  Prior or Outpatient Records Reviewed [Y/N]:

## 2020-09-20 NOTE — PROGRESS NOTE ADULT - PROBLEM SELECTOR PLAN 1
Meets SIRS criteria: leukocytosis, tachycardia, fevers, Lactate previously elevated (2.7). Most likely secondary to bacterial pneumonia. Fevers resolved.   -Continue antibiotics: azithromycin and ceftriaxone  -Supplemental O2 via nasal cannula/ Titrate down to 3L  -F/u blood cultures  -UA negative for bacteria, positive for protein and hyaline casts  -Legionella negative  -RVP negative, COVID negative   -Trend WBC  -Vitals q4h Meets SIRS criteria: leukocytosis, tachycardia, fevers, Lactate previously elevated (2.7). Most likely secondary to bacterial pneumonia. Fevers resolved.   -Continue antibiotics: azithromycin and ceftriaxone  -Supplemental O2 via nasal cannula/ Titrate down to 3L  -BCx NGTD  -UA negative for bacteria, positive for protein and hyaline casts  -Legionella negative  -RVP negative, COVID negative   -Trend WBC  -Vitals q4h  -Check amb SpO2

## 2020-09-20 NOTE — PROGRESS NOTE ADULT - PROBLEM SELECTOR PLAN 3
Hx of smoking, not on home O2  -Supplemental O2 via nasal cannula  -Duonebs q6h  -Prednisone 20mg BID. S/p IV solumedrol 125mg in the ED  -Vitals q4h Hx of smoking, not on home O2  -Supplemental O2 via nasal cannula  -Duonebs q6h  -S/p IV solumedrol 125mg in the ED. Prednisone 20mg BID, taper to 20mg qd tomorrow 9/21  -Vitals q4h

## 2020-09-20 NOTE — PHYSICAL THERAPY INITIAL EVALUATION ADULT - ADDITIONAL COMMENTS
Pt lives in apartment alone with wife. +elevator acces, 2-3 steps to enter building (unless entering through basement - no steps to negotiate). Pt was independent in functional activities prior to admission without use of assistive device.     Pt left semi-wong in bed, NAD. +call bell. RN aware of session.

## 2020-09-20 NOTE — PHYSICAL THERAPY INITIAL EVALUATION ADULT - DISCHARGE DISPOSITION, PT EVAL
Anticipate no skilled PT needs upon discharge. Pt would benefit from PT in hospital to improve safety and functional mobility prior to discharge./no skilled PT needs

## 2020-09-20 NOTE — PHYSICAL THERAPY INITIAL EVALUATION ADULT - GENERAL OBSERVATIONS, REHAB EVAL
Pt received semi-wong in bed, NAD. Pt agreeable to PT consultation. Cleared for PT as per ANN MARIE Duran

## 2020-09-20 NOTE — PROGRESS NOTE ADULT - PROBLEM SELECTOR PLAN 4
Elevated BUN and Cr with BUN/Cr ratio 38.5. Likely prerenal in etiology. Patient reporting low PO intake  -IV normal saline  -Trend Cr  -PCP reports baseline creatinine of 1.4  -Bladder scan to assess for retention

## 2020-09-20 NOTE — PHYSICAL THERAPY INITIAL EVALUATION ADULT - PERTINENT HX OF CURRENT PROBLEM, REHAB EVAL
69 y.o. male with history of COPD not on home oxygen, HTN, HLD, essential tremor admitted for dyspnea and fevers concerning for pneumonia. CXR showing Left Lower Lobe opacity. Breathing status greatly improved.

## 2020-09-21 ENCOUNTER — TRANSCRIPTION ENCOUNTER (OUTPATIENT)
Age: 70
End: 2020-09-21

## 2020-09-21 VITALS
SYSTOLIC BLOOD PRESSURE: 134 MMHG | OXYGEN SATURATION: 98 % | DIASTOLIC BLOOD PRESSURE: 74 MMHG | HEART RATE: 56 BPM | TEMPERATURE: 98 F | RESPIRATION RATE: 17 BRPM

## 2020-09-21 LAB
ALBUMIN SERPL ELPH-MCNC: 2.9 G/DL — LOW (ref 3.3–5)
ALP SERPL-CCNC: 79 U/L — SIGNIFICANT CHANGE UP (ref 40–120)
ALT FLD-CCNC: 97 U/L — HIGH (ref 4–41)
ANION GAP SERPL CALC-SCNC: 13 MMO/L — SIGNIFICANT CHANGE UP (ref 7–14)
AST SERPL-CCNC: 47 U/L — HIGH (ref 4–40)
BASOPHILS # BLD AUTO: 0.03 K/UL — SIGNIFICANT CHANGE UP (ref 0–0.2)
BASOPHILS NFR BLD AUTO: 0.2 % — SIGNIFICANT CHANGE UP (ref 0–2)
BILIRUB SERPL-MCNC: < 0.2 MG/DL — LOW (ref 0.2–1.2)
BUN SERPL-MCNC: 44 MG/DL — HIGH (ref 7–23)
CALCIUM SERPL-MCNC: 9 MG/DL — SIGNIFICANT CHANGE UP (ref 8.4–10.5)
CHLORIDE SERPL-SCNC: 103 MMOL/L — SIGNIFICANT CHANGE UP (ref 98–107)
CO2 SERPL-SCNC: 24 MMOL/L — SIGNIFICANT CHANGE UP (ref 22–31)
CREAT SERPL-MCNC: 1.31 MG/DL — HIGH (ref 0.5–1.3)
EOSINOPHIL # BLD AUTO: 0.03 K/UL — SIGNIFICANT CHANGE UP (ref 0–0.5)
EOSINOPHIL NFR BLD AUTO: 0.2 % — SIGNIFICANT CHANGE UP (ref 0–6)
GLUCOSE SERPL-MCNC: 131 MG/DL — HIGH (ref 70–99)
HCT VFR BLD CALC: 38.2 % — LOW (ref 39–50)
HGB BLD-MCNC: 11.9 G/DL — LOW (ref 13–17)
IMM GRANULOCYTES NFR BLD AUTO: 7.7 % — HIGH (ref 0–1.5)
LYMPHOCYTES # BLD AUTO: 13.5 % — SIGNIFICANT CHANGE UP (ref 13–44)
LYMPHOCYTES # BLD AUTO: 2.47 K/UL — SIGNIFICANT CHANGE UP (ref 1–3.3)
MAGNESIUM SERPL-MCNC: 2.4 MG/DL — SIGNIFICANT CHANGE UP (ref 1.6–2.6)
MCHC RBC-ENTMCNC: 28.3 PG — SIGNIFICANT CHANGE UP (ref 27–34)
MCHC RBC-ENTMCNC: 31.2 % — LOW (ref 32–36)
MCV RBC AUTO: 90.7 FL — SIGNIFICANT CHANGE UP (ref 80–100)
MONOCYTES # BLD AUTO: 1.67 K/UL — HIGH (ref 0–0.9)
MONOCYTES NFR BLD AUTO: 9.2 % — SIGNIFICANT CHANGE UP (ref 2–14)
NEUTROPHILS # BLD AUTO: 12.62 K/UL — HIGH (ref 1.8–7.4)
NEUTROPHILS NFR BLD AUTO: 69.2 % — SIGNIFICANT CHANGE UP (ref 43–77)
NRBC # FLD: 0 K/UL — SIGNIFICANT CHANGE UP (ref 0–0)
PHOSPHATE SERPL-MCNC: 4.4 MG/DL — SIGNIFICANT CHANGE UP (ref 2.5–4.5)
PLATELET # BLD AUTO: 531 K/UL — HIGH (ref 150–400)
PMV BLD: 10.4 FL — SIGNIFICANT CHANGE UP (ref 7–13)
POTASSIUM SERPL-MCNC: 5.1 MMOL/L — SIGNIFICANT CHANGE UP (ref 3.5–5.3)
POTASSIUM SERPL-SCNC: 5.1 MMOL/L — SIGNIFICANT CHANGE UP (ref 3.5–5.3)
PROT SERPL-MCNC: 6.5 G/DL — SIGNIFICANT CHANGE UP (ref 6–8.3)
RBC # BLD: 4.21 M/UL — SIGNIFICANT CHANGE UP (ref 4.2–5.8)
RBC # FLD: 15.3 % — HIGH (ref 10.3–14.5)
SODIUM SERPL-SCNC: 140 MMOL/L — SIGNIFICANT CHANGE UP (ref 135–145)
WBC # BLD: 18.23 K/UL — HIGH (ref 3.8–10.5)
WBC # FLD AUTO: 18.23 K/UL — HIGH (ref 3.8–10.5)

## 2020-09-21 PROCEDURE — 99239 HOSP IP/OBS DSCHRG MGMT >30: CPT

## 2020-09-21 RX ADMIN — Medication 81 MILLIGRAM(S): at 11:34

## 2020-09-21 RX ADMIN — ALBUTEROL 2 PUFF(S): 90 AEROSOL, METERED ORAL at 05:22

## 2020-09-21 RX ADMIN — HEPARIN SODIUM 5000 UNIT(S): 5000 INJECTION INTRAVENOUS; SUBCUTANEOUS at 05:22

## 2020-09-21 RX ADMIN — ALBUTEROL 2 PUFF(S): 90 AEROSOL, METERED ORAL at 11:34

## 2020-09-21 RX ADMIN — Medication 20 MILLIGRAM(S): at 05:23

## 2020-09-21 RX ADMIN — AZITHROMYCIN 255 MILLIGRAM(S): 500 TABLET, FILM COATED ORAL at 00:19

## 2020-09-21 NOTE — PROGRESS NOTE ADULT - PROBLEM SELECTOR PLAN 2
CXR showing LLL opacity suspicious for bacterial pna. No longer dyspneic or febrile.  - Continue abx: azithromycin and ceftriaxone for CAP. Consider switch to PO abx  - No longer requiring supplemental O2  - Bcx NGTD, legionella negative  - Guaifenesin 200mg q6h PRN for congestion CXR showing LLL opacity suspicious for bacterial pna. No longer dyspneic or febrile.  - Transition to PO abx as above  - No longer requiring supplemental O2  - Bcx NGTD, legionella negative  - Guaifenesin 200mg q6h PRN for congestion

## 2020-09-21 NOTE — PROGRESS NOTE ADULT - ATTENDING COMMENTS
SpO2 87-89% while ambulating on room air. Pt has COPD so baseline SpO2 likely low 90s. Anticipate he will be ready for d/c home in 24-48 hours. Continue current abx.
Feeling much better. Continue abx, continue to wean O2, f/u cultures.
Sepsis (leukocytosis, fever) on admission d/t L sided pneumonia, sepsis resolved, on Ceftriaxone/Zithromax, will d/c on PO Levaquin   Acute hypoxic respiratory distress d/t pneumonia and COPD exacerbation, s/p BIPAP in ER, now resolved and sating well on RA   COPD exacerbation d/t PNA, antibiotics as above, duonebs, Prednisone 20mg daily x 5 more days   MAX likely pre-renal, improved s/p IVF, hold ARB, monitor Cr   HTN, hold ARB d/t MAX, monitor BP  Essential tremors, c/w Propranolol   DC home, d/c time 32 minutes

## 2020-09-21 NOTE — PROGRESS NOTE ADULT - PROBLEM SELECTOR PLAN 1
No longer meeting SIRS criteria. WBC 18k, but afebrile, not tachycardic.   - Continue abx: azithromycin and ceftriaxone  - No longer requiring supplemental O2  - Bcx NGTD  - UA negative for bacteria, + for protein, hyaline casts  - Legionella negative, RVP negative, COVID negative  - WBC 18k, down from 21k No longer meeting SIRS criteria. WBC 18k, but afebrile, not tachycardic.   - Transition from IV ceftriaxone, azithromycin --> PO levofloxacin x10 days  - No longer requiring supplemental O2  - Bcx NGTD  - UA negative for bacteria, + for protein, hyaline casts  - Legionella negative, RVP negative, COVID negative  - WBC 18k, down from 21k No longer meeting SIRS criteria. WBC 18k, but afebrile, not tachycardic.   - Transition from IV ceftriaxone, azithromycin --> PO Levaquin 750mg daily x10d  - No longer requiring supplemental O2  - Bcx NGTD  - UA negative for bacteria, + for protein, hyaline casts  - Legionella negative, RVP negative, COVID negative  - WBC 18k, down from 21k

## 2020-09-21 NOTE — DISCHARGE NOTE NURSING/CASE MANAGEMENT/SOCIAL WORK - PATIENT PORTAL LINK FT
You can access the FollowMyHealth Patient Portal offered by [facility] by registering at the following website: [email or web address]. By joining [employer]’s FollowMyHealth portal, you will also be able to view your health information using other applications (apps) compatible with our system.

## 2020-09-21 NOTE — PROGRESS NOTE ADULT - PROBLEM SELECTOR PLAN 3
- No longer requiring supplemental O2  - Duonebs q6h  - S/p IV solumedrol 125mg in ED.  - Prednisone 20mg BID, taper tp 20mg qd today  - Vitals q4h - No longer requiring supplemental O2  - Duonebs q6h  - S/p IV solumedrol 125mg in ED.  - Prednisone 20mg BID, taper tp 20mg qd today. Will continue 20mg qd x5 days.  - Vitals q4h

## 2020-09-21 NOTE — PROGRESS NOTE ADULT - PROBLEM SELECTOR PLAN 7
SubQ heparin for DVT prophylaxis  Diet: DASH/TLC  Dispo: discharge today SubQ heparin for DVT prophylaxis  Diet: DASH/TLC  Dispo: discharge today with PO abx

## 2020-09-21 NOTE — PROGRESS NOTE ADULT - ASSESSMENT
69M h/o COPD not on home oxygen, HTN, HLD, essential tremor admitted for dyspnea and fevers concerning for pneumonia. CXR showing LLL opacity. Breathing status greatly improved, no longer requiring O2, afebrile. Appetite intact, voiding.

## 2020-09-21 NOTE — PROGRESS NOTE ADULT - PROBLEM SELECTOR PLAN 4
Elevated BUN/Cr, now 44/1.31 (ratio 34.35)  Trend Cr Elevated BUN/Cr, now 44/1.31 (ratio 34.35)  Trend Cr  Encourage hydration

## 2020-09-21 NOTE — PROGRESS NOTE ADULT - SUBJECTIVE AND OBJECTIVE BOX
PROGRESS NOTE:  Authored by Freddy Jamil, MS4 (subintern)    Patient is a 69y old  Male who presents with a chief complaint of PNA (20 Sep 2020 07:26)      SUBJECTIVE / Objective    Patient slept well overnight. He reports feeling better, but still has a cough. He states his cough is slightly increased from his baseline, but decreased from over the weekend when he was feeling more sick. He reports a good appetite. He denies congestion, dyspnea, chest pain, fever, chills, nausea, vomiting. He reports normal bowel movements, but states he is urinating more frequently than usual.    MEDICATIONS  (STANDING):  ALBUTerol    90 MICROgram(s) HFA Inhaler 2 Puff(s) Inhalation every 6 hours  aspirin  chewable 81 milliGRAM(s) Oral daily  atorvastatin 10 milliGRAM(s) Oral at bedtime  azithromycin  IVPB 500 milliGRAM(s) IV Intermittent every 24 hours  cefTRIAXone   IVPB 1000 milliGRAM(s) IV Intermittent every 24 hours  heparin   Injectable 5000 Unit(s) SubCutaneous every 12 hours  melatonin 3 milliGRAM(s) Oral at bedtime  predniSONE   Tablet 20 milliGRAM(s) Oral daily  propranolol  milliGRAM(s) Oral daily  sodium chloride 0.9%. 1000 milliLiter(s) (75 mL/Hr) IV Continuous <Continuous>    MEDICATIONS  (PRN):  acetaminophen    Suspension .. 650 milliGRAM(s) Oral every 6 hours PRN Mild Pain (1 - 3), Moderate Pain (4 - 6)  guaiFENesin   Syrup  (Sugar-Free) 200 milliGRAM(s) Oral every 6 hours PRN Congestion    PHYSICAL EXAM:  GENERAL: NAD, well-developed  NEUROLOGY: non-focal  CHEST/LUNG: Normal respiratory effort, +wheezes in the left lower lung field  HEART: Regular rate and rhythm; No murmurs, rubs, or gallops  ABDOMEN: Soft, Nontender, Nondistended    LABS:                         11.9   18.23 )-----------( 531      ( 21 Sep 2020 06:10 )             38.2     09-21    140  |  103  |  44<H>  ----------------------------<  131<H>  5.1   |  24  |  1.31<H>    Ca    9.0      21 Sep 2020 06:10  Phos  4.4     09-21  Mg     2.4     09-21    TPro  6.5  /  Alb  2.9<L>  /  TBili  < 0.2<L>  /  DBili  x   /  AST  47<H>  /  ALT  97<H>  /  AlkPhos  79  09-21 PROGRESS NOTE:  Authored by Freddy Jamil, MS4 (subintern)  Pager [phone]    Patient is a 69y old  Male who presents with a chief complaint of PNA (20 Sep 2020 07:26)      SUBJECTIVE / Objective    Patient slept well overnight. He reports feeling better, but still has a cough. He states his cough is slightly increased from his baseline, but decreased from over the weekend when he was feeling more sick. He reports a good appetite. He denies congestion, dyspnea, chest pain, fever, chills, nausea, vomiting. He reports normal bowel movements, but states he is urinating more frequently than usual.    MEDICATIONS  (STANDING):  ALBUTerol    90 MICROgram(s) HFA Inhaler 2 Puff(s) Inhalation every 6 hours  aspirin  chewable 81 milliGRAM(s) Oral daily  atorvastatin 10 milliGRAM(s) Oral at bedtime  azithromycin  IVPB 500 milliGRAM(s) IV Intermittent every 24 hours  cefTRIAXone   IVPB 1000 milliGRAM(s) IV Intermittent every 24 hours  heparin   Injectable 5000 Unit(s) SubCutaneous every 12 hours  melatonin 3 milliGRAM(s) Oral at bedtime  predniSONE   Tablet 20 milliGRAM(s) Oral daily  propranolol  milliGRAM(s) Oral daily  sodium chloride 0.9%. 1000 milliLiter(s) (75 mL/Hr) IV Continuous <Continuous>    MEDICATIONS  (PRN):  acetaminophen    Suspension .. 650 milliGRAM(s) Oral every 6 hours PRN Mild Pain (1 - 3), Moderate Pain (4 - 6)  guaiFENesin   Syrup  (Sugar-Free) 200 milliGRAM(s) Oral every 6 hours PRN Congestion    PHYSICAL EXAM:  GENERAL: NAD, well-developed  NEUROLOGY: non-focal  CHEST/LUNG: Normal respiratory effort, +wheezes in the left lower lung field  HEART: Regular rate and rhythm; No murmurs, rubs, or gallops  ABDOMEN: Soft, Nontender, Nondistended    LABS:                         11.9   18.23 )-----------( 531      ( 21 Sep 2020 06:10 )             38.2     09-21    140  |  103  |  44<H>  ----------------------------<  131<H>  5.1   |  24  |  1.31<H>    Ca    9.0      21 Sep 2020 06:10  Phos  4.4     09-21  Mg     2.4     09-21    TPro  6.5  /  Alb  2.9<L>  /  TBili  < 0.2<L>  /  DBili  x   /  AST  47<H>  /  ALT  97<H>  /  AlkPhos  79  09-21

## 2020-09-22 ENCOUNTER — NON-APPOINTMENT (OUTPATIENT)
Age: 70
End: 2020-09-22

## 2020-09-22 RX ORDER — OLMESARTAN MEDOXOMIL AND HYDROCHLOROTHIAZIDE 40; 25 MG/1; MG/1
40-25 TABLET ORAL DAILY
Refills: 0 | Status: DISCONTINUED | COMMUNITY
Start: 2020-09-22 | End: 2020-09-22

## 2020-09-22 RX ORDER — FLUTICASONE FUROATE, UMECLIDINIUM BROMIDE AND VILANTEROL TRIFENATATE 100; 62.5; 25 UG/1; UG/1; UG/1
100-62.5-25 POWDER RESPIRATORY (INHALATION)
Refills: 0 | Status: ACTIVE | COMMUNITY
Start: 2020-09-22

## 2020-09-22 RX ORDER — PROPRANOLOL HYDROCHLORIDE 120 MG/1
120 CAPSULE, EXTENDED RELEASE ORAL DAILY
Refills: 0 | Status: ACTIVE | COMMUNITY
Start: 2020-09-22

## 2020-09-22 RX ORDER — PRAVASTATIN SODIUM 20 MG/1
20 TABLET ORAL
Refills: 0 | Status: ACTIVE | COMMUNITY
Start: 2020-09-22

## 2020-09-22 RX ORDER — CIPROFLOXACIN LACTATE 400MG/40ML
1 VIAL (ML) INTRAVENOUS
Qty: 6 | Refills: 0
Start: 2020-09-22 | End: 2020-09-27

## 2020-09-22 RX ORDER — OLMESARTAN MEDOXOMIL 40 MG/1
40 TABLET, FILM COATED ORAL DAILY
Qty: 14 | Refills: 0 | Status: ACTIVE | COMMUNITY
Start: 2020-09-22 | End: 1900-01-01

## 2020-09-22 RX ORDER — CHOLECALCIFEROL (VITAMIN D3) 1250 MCG
1.25 MG CAPSULE ORAL
Refills: 0 | Status: ACTIVE | COMMUNITY
Start: 2020-09-22

## 2020-09-23 ENCOUNTER — APPOINTMENT (OUTPATIENT)
Dept: CARE COORDINATION | Facility: HOME HEALTH | Age: 70
End: 2020-09-23
Payer: MEDICARE

## 2020-09-23 ENCOUNTER — FORM ENCOUNTER (OUTPATIENT)
Age: 70
End: 2020-09-23

## 2020-09-23 DIAGNOSIS — Z87.891 PERSONAL HISTORY OF NICOTINE DEPENDENCE: ICD-10-CM

## 2020-09-23 DIAGNOSIS — J18.9 PNEUMONIA, UNSPECIFIED ORGANISM: ICD-10-CM

## 2020-09-23 DIAGNOSIS — I10 ESSENTIAL (PRIMARY) HYPERTENSION: ICD-10-CM

## 2020-09-23 PROBLEM — E78.5 HYPERLIPIDEMIA, UNSPECIFIED: Chronic | Status: ACTIVE | Noted: 2020-09-18

## 2020-09-23 PROBLEM — G25.0 ESSENTIAL TREMOR: Chronic | Status: ACTIVE | Noted: 2020-09-18

## 2020-09-23 LAB
CULTURE RESULTS: SIGNIFICANT CHANGE UP
CULTURE RESULTS: SIGNIFICANT CHANGE UP
SPECIMEN SOURCE: SIGNIFICANT CHANGE UP
SPECIMEN SOURCE: SIGNIFICANT CHANGE UP

## 2020-09-23 PROCEDURE — 99349 HOME/RES VST EST MOD MDM 40: CPT | Mod: 95

## 2020-09-23 NOTE — COUNSELING
[Risk of tobacco use and health benefits of smoking cessation discussed] : Risk of tobacco use and health benefits of smoking cessation discussed [Cessation strategies including cessation program discussed] : Cessation strategies including cessation program discussed [Use of nicotine replacement therapies and other medications discussed] : Use of nicotine replacement therapies and other medications discussed [Willing to Quit Smoking] : Willing to quit smoking [FreeTextEntry2] : Last cigarette 9/17/2020

## 2020-09-23 NOTE — PHYSICAL EXAM
[No Acute Distress] : no acute distress [No Respiratory Distress] : no respiratory distress  [No Accessory Muscle Use] : no accessory muscle use [No Edema] : there was no peripheral edema [Alert and Oriented x3] : oriented to person, place, and time

## 2020-09-23 NOTE — HISTORY OF PRESENT ILLNESS
[Home] : at home, [unfilled] , at the time of the visit. [Verbal consent obtained from patient] : the patient, [unfilled] [Spouse] : spouse [FreeTextEntry1] : "Follow up PNA and HTN" Telehealth visit conducted with patient in home for follow up [facility] PNA program. Pt is A&O x 3, denies headache, dizziness, sob, chest pain, cough, N/V/D, LE edema. Ambulating better today (1 block and a half) and less SOB. Continues on prednisone 20 mg and levaquin for PNA. Has not had to use prn albuterol for SOB.Pt reports chronic smoker x 40 years, last cigarette 9/17 on admission. On treology for COPD, no coughing , some SOB with exertion. Independent in ADLs/IADLs at baseline. During admission olmesartan/hctz changed to benicar 40 with Cr 1.3 at discharge. Pt reports eating and drinking, no LE edema. //77 today after exertion.  [de-identified] : Hospital Course: \par Discharge Date	21-Sep-2020 \par Admission Date	18-Sep-2020 05:09 \par Reason for Admission	PNA \par Hospital Course	 \par HPI: The patient is a 69 year old man with PMH of COPD not on home oxygen, HTN, \par HLD, essential tremor, presenting with SOB and fevers. \par \par ED course: Vitals- Tmax 103.1, , /76, RR 25, O2 sat 87% on room \par air, 98% on BiPAP. CXR showed a left lower opacity concerning for pneumonia. \par MICU consulted for new BiPAP. s/p azithromycin, ceftriaxone, IV solumedrol \par 125mg, 1L NS bolus \par \par Hospital course: Patient w/ sepsis present on admission d/t L sided pneumonia. \par He was started on azithromycin and ceftriaxone. Acute hypoxic respiratory \par failure d/t COPD exacerbation and Pneumonia. His BiPAP was removed and switched \par to nasal cannula. Patient was started on duonebs ATC and Prednisone 20mg BID \par for COPD exacerbation. His breathing status continued to improve and he was \par weaned to RA. Prednisone was tapered to 20mg daily and will continue for 5 more \par days after discharge. Antibiotics were transitioned to PO Levaquin. PT \par evaluated pt, no needs. Hospital course also complicated by MAX on admission. \par MAX likely pre-renal and improved w/ IVF, ARB was held. On 9/21, he reports \par improvement in his symptoms with no shortness of breath and decreased cough. He \par is tolerating a regular diet, ambulating without difficulty and saturating > \par 90% on RA. \par

## 2020-10-05 ENCOUNTER — NON-APPOINTMENT (OUTPATIENT)
Age: 70
End: 2020-10-05

## 2020-10-05 RX ORDER — LEVOFLOXACIN 750 MG/1
750 TABLET, FILM COATED ORAL DAILY
Qty: 6 | Refills: 0 | Status: DISCONTINUED | COMMUNITY
Start: 2020-09-22 | End: 2020-10-05

## 2020-10-05 RX ORDER — PREDNISONE 20 MG/1
20 TABLET ORAL DAILY
Refills: 0 | Status: DISCONTINUED | COMMUNITY
Start: 2020-09-22 | End: 2020-10-05

## 2024-08-14 NOTE — PROGRESS NOTE ADULT - PROBLEM SELECTOR PLAN 3
Gastroenterology Progress Note      Subjective  Patient planning to go to IR today for choly tube check although now is having drainage.  Pain is improving. Lipase improved. Not using much narcotics throughout the day today    Objective    Visit Vitals  BP (!) 146/83   Pulse 76   Temp 98.7 °F (37.1 °C)   Resp 16   Ht 5' 9\" (1.753 m)   Wt 97.4 kg (214 lb 11.7 oz)   SpO2 94%   BMI 31.71 kg/m²       Physical Exam  Vitals reviewed.   Constitutional:       Appearance: Normal appearance.   Abdominal:      General: Bowel sounds are normal. There is no distension.      Palpations: Abdomen is soft.      Tenderness: There is abdominal tenderness.   Neurological:      Mental Status: He is alert.         Labs     Recent Labs   Lab 08/14/24  0615   WBC 7.4   RBC 4.55   HGB 13.1   HCT 40.3   *     No results found   Lab Results   Component Value Date    AST 24 08/14/2024    AST 31 08/13/2024    AST 39 (H) 07/08/2024    GPT 27 08/14/2024    GPT 37 08/13/2024    GPT 28 07/08/2024    ALKPT 91 08/14/2024    ALKPT 106 08/13/2024    ALKPT 107 07/08/2024    BILIRUBIN 0.6 08/14/2024    BILIRUBIN 0.6 08/13/2024    BILIRUBIN 0.9 07/08/2024    DBIL 2.8 (H) 06/05/2024    DBIL 2.6 (H) 06/05/2024    DBIL 2.7 (H) 06/05/2024        Imaging  CT ABDOMEN PELVIS WO CONTRAST  Narrative: CT OF THE ABDOMEN AND PELVIS DATED: 8/13/2024    COMPARISON: CT abdomen pelvis 6/5/2024, MRI abdomen 6/5/2024    CLINICAL HISTORY: Abdominal pain.    TECHNIQUE:  Helical CT of the abdomen and pelvis was performed without the  administration of intravenous or oral contrast.  Coronal and sagittal  reformats were obtained.  Iterative reconstruction technique was utilized  as radiation dose reduction strategy in this patient.    FINDINGS:    Suboptimal assessment of solid abdominal organs in the absence of  intravenous contrast.    Residuals of aortic valve replacement identified. Common bile duct stent in  place.    Prominent body habitus.    Trace right and small  left pleural effusions. The heart is mildly enlarged.  Severe coronary calcifications. Calcified descending thoracic aorta. There  is bilateral lower lung atelectasis. Calcified granuloma left lower lobe  with chronic granulomatous disease.    There is air within the left intrahepatic biliary ducts postprocedural in  nature.    There is no gross focal liver lesion.    There is mild hepatomegaly. Right hepatic lobe measures 18.4 cm  craniocaudal.    There is wall thickening of the gallbladder. There is subtle fat stranding  adjacent to the mildly poorly defined gallbladder wall. There is evidence  for calcified cholelithiasis.    There is a cholecystostomy tube. The pigtail is at the inferolateral aspect  of the gallbladder. It is uncertain if this pigtail is intraluminal within  the gallbladder, within the gallbladder wall, or extraluminal. Consultation  with interventional radiology is recommended. The gallbladder does not  appear decompressed. This could be suggestive of extraluminal location of  the pigtail of the cholecystostomy tube. Further evaluation recommended.    No biliary ductal dilatation. The common bile duct stent is  well-positioned.    Multiple calcified granulomas within the spleen from chronic granulomatous  disease. No splenomegaly.    There is peripancreatic edema which is mild to moderate in degree. Patient  has had a recent ERCP. Findings compatible with acute pancreatitis. No  focal pancreatic lesion. No peripancreatic well-organized fluid collection.    The bilateral adrenal glands show no focal lesions.    No renal calculi. No hydronephrosis. Minimally complex cyst superomedially  right renal upper pole. There is bilateral perinephric infiltration  suggesting underlying chronic medical renal disease. No other focal renal  lesions.    No abdominal aortic aneurysm. Moderate calcified arterial disease.    Trace right and mild left fat-containing inguinal hernias.    There is suggestion of wall  thickening at the low rectum which could be  artifact from decompression but underlying inflammation or tumor not  excluded. Colonoscopy recommended if not recently performed.    There is no dilatation of bowel to suggest bowel obstruction.    There is severe colonic diverticulosis. No evidence for diverticulitis.    There is stool retention mildly within the large bowel.    Normal appendix.    No bowel dilation to suggest obstruction. No abscess. No free  intraperitoneal air.    No acute findings paraspinal soft tissues.    Lucent areas within the left femoral intertrochanteric region are stable  this was described on an outside CT from 2018 although images are not  available. This has a sclerotic rim. 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 Fibrous dysplasia is in the differential. Outpatient  orthopedic surgery follow-up is recommended. This is felt to be a benign  lesion but there is a very small chance of reported malignant  transformation. No associated fracture.    There is osteopenia. Multilevel spondylosis. Mild biconvex curvature of the  spine. Osteopenia. Severe lumbar spondylosis. Mild to moderate thoracic  spondylosis. Surgical clips within the lower pelvis identified.  Impression: 1.   Cholecystostomy tube in place. The pigtail is at the inferolateral  aspect of the gallbladder. It is uncertain if this pigtail is intraluminal  within the gallbladder, within the gallbladder wall, or extraluminal.  Consultation with interventional radiology is recommended. The gallbladder  is not decompressed. This could be suggestive of extraluminal location of  the pigtail of the cholecystostomy tube. Further evaluation recommended.  2.   Cholelithiasis and underlying cholecystitis.  3.   Acute pancreatitis this patient with recent ERCP.  4.   Common bile duct stent in place. No biliary ductal dilatation.  5.   Trace right and small left pleural effusions.  6.   Severe colonic diverticulosis. No evidence  for diverticulitis.  7.   Wall thickening at the low rectum which could be artifact from  decompression but underlying inflammation or tumor not excluded.  Colonoscopy recommended if not recently performed.   8.   Lucent lesion within the intertrochanteric left femur with peripheral  calcification may be secondary to liposclerosing myxofibrous tumor or less  likely fibrous dysplasia. No associated fracture or evidence for malignant  transformation. This lesion is favored to be benign but outpatient  orthopedic surgery consultation recommended.               9.   Other findings as above.    Electronically Signed by: YUMIKO FORREST M.D.   Signed on: 8/13/2024 2:33 PM   Workstation ID: [id number]         Assessment and Plan:  Post ERCP pancreatitis  Recent cholecystitis s/p percutaneous cholecystostomy tubes     Continue aggressive IVF  Strict NPO  To IR today for cholecystostomy tube check  Surgery on consults - recs appreciated  Hoping pain will continue to improve and can start clear liquids tomorrow    Lois Chavez MD      Hx of smoking, not on home O2  -Supplemental O2 via nasal cannula  -Duonebs q6h  -Prednisone 20mg BID starting tomorrow. S/p IV solumedrol 125mg in the ED  -Vitals q4h Hx of smoking, not on home O2  -Supplemental O2 via nasal cannula  -Duonebs q6h  -Prednisone 20mg BID. S/p IV solumedrol 125mg in the ED  -Vitals q4h